# Patient Record
Sex: FEMALE | Race: WHITE | NOT HISPANIC OR LATINO | Employment: OTHER | URBAN - NONMETROPOLITAN AREA
[De-identification: names, ages, dates, MRNs, and addresses within clinical notes are randomized per-mention and may not be internally consistent; named-entity substitution may affect disease eponyms.]

---

## 2017-08-04 PROBLEM — M54.16 LUMBAR RADICULITIS: Status: ACTIVE | Noted: 2017-08-04

## 2017-09-01 PROBLEM — M47.816 FACET ARTHRITIS, DEGENERATIVE, LUMBAR SPINE: Status: ACTIVE | Noted: 2017-09-01

## 2019-12-20 PROBLEM — M47.896 OTHER SPONDYLOSIS, LUMBAR REGION: Status: ACTIVE | Noted: 2019-12-20

## 2020-08-12 PROBLEM — H25.11 AGE-RELATED NUCLEAR CATARACT, RIGHT EYE: Status: ACTIVE | Noted: 2020-08-12

## 2020-08-14 ENCOUNTER — HOSPITAL ENCOUNTER (OUTPATIENT)
Dept: PREADMISSION TESTING | Facility: HOSPITAL | Age: 65
Discharge: HOME | End: 2020-08-14
Attending: OPHTHALMOLOGY
Payer: COMMERCIAL

## 2020-08-14 VITALS — HEIGHT: 62 IN | BODY MASS INDEX: 21.71 KG/M2 | WEIGHT: 118 LBS

## 2020-08-14 DIAGNOSIS — Z03.818 ENCOUNTER FOR OBSERVATION FOR SUSPECTED EXPOSURE TO OTHER BIOLOGICAL AGENTS RULED OUT: ICD-10-CM

## 2020-08-14 NOTE — DISCHARGE INSTRUCTIONS
BEFORE THE PROCEDURE:    REPORT ANY CHANGE IN YOUR PHYSICAL CONDITION TO YOUR DOCTOR IMMEDIATELY.  SELF ISOLATE AND CHECK TEMPERATURE DAILY, IF TEMP OVER 100, CALL PHYSICIAN IMMEDIATELY.   NO SMOKING OR ALCOHOL FOR 72 HOURS BEFORE YOUR PROCEDURE.   DO NOT EAT OR DRINK ANYTHING AFTER MIDNIGHT THE NIGHT BEFORE YOUR PROCEDURE    THE MORNING OF YOUR PROCEDURE:    PLEASE ARRIVE AT THE FRONT LOBBY AT 6 a.m. AND REPORT TO FIRST FLOOR REGISTRATION.   YOU MAY GET A PHONE CALL THE DAY BEFORE YOUR PROCEDURE IF THE APPOINTED TIME CHANGES.  NO MAKE UP OR NAIL POLISH.   ALL MINORS MUST BE ACCOMPANIED BY AN ADULT AT ALL TIMES.     TAKE A SHOWER/BATH THE NIGHT BEFORE YOUR PROCEDURE.     DAY OF YOUR PROCEDURE:    TAKE BLOOD PRESSURE MEDICATIONS THE MORNING OF YOUR PROCEDURE, WITH SMALL SIPS WATER, AS DIRECTED BY YOUR PHYSICIAN.   DO NOT TAKE ANY DIABETIC MEDICATIONS UNLESS DIRECTED TO DO SO BY YOUR PHYSICIAN.   CONTACT LENSES AND DENTURES MUST BE REMOVED.  A RESPONSIBLE ADULT MUST ACCOMPANY YOU HOME UPON DISCHARGE.   ONLY 1 VISITORS ALLOWED PER ROOM.       COVID 19: RETURN TO FIRST FLOOR REGISTRATION ON Saturday AUGUST 15, 2020 @ 10:30 a.m.    BRING YOUR EYE DROPS DAY OF PROCEDURE

## 2020-08-15 ENCOUNTER — HOSPITAL ENCOUNTER (OUTPATIENT)
Dept: PREADMISSION TESTING | Facility: HOSPITAL | Age: 65
Discharge: HOME | End: 2020-08-15
Attending: OPHTHALMOLOGY
Payer: COMMERCIAL

## 2020-08-15 DIAGNOSIS — Z03.818 ENCOUNTER FOR OBSERVATION FOR SUSPECTED EXPOSURE TO OTHER BIOLOGICAL AGENTS RULED OUT: ICD-10-CM

## 2020-08-15 PROCEDURE — U0003 INFECTIOUS AGENT DETECTION BY NUCLEIC ACID (DNA OR RNA); SEVERE ACUTE RESPIRATORY SYNDROME CORONAVIRUS 2 (SARS-COV-2) (CORONAVIRUS DISEASE [COVID-19]), AMPLIFIED PROBE TECHNIQUE, MAKING USE OF HIGH THROUGHPUT TECHNOLOGIES AS DESCRIBED BY CMS-2020-01-R: HCPCS

## 2020-08-16 LAB — SARS-COV-2 RNA RESP QL NAA+PROBE: NOT DETECTED

## 2020-08-17 ENCOUNTER — ANESTHESIA EVENT (OUTPATIENT)
Dept: SURGERY | Facility: HOSPITAL | Age: 65
End: 2020-08-17
Payer: COMMERCIAL

## 2020-08-17 NOTE — ANESTHESIA PREPROCEDURE EVALUATION
08/17/2020  Tiffani Wheeler is a 64 y.o., female.    Anesthesia Evaluation    I have reviewed the Patient Summary Reports.   I have reviewed the NPO Status.   I have reviewed the Medications.     Review of Systems  Anesthesia Hx:  No problems with previous Anesthesia  Denies Family Hx of Anesthesia complications.  Personal Hx of Anesthesia complications  Difficult Intubation   Social:  Non-Smoker    Cardiovascular:   Hypertension    Pulmonary:   Asthma    Renal/:  Renal/ Normal     Hepatic/GI:   GERD    Musculoskeletal:   Arthritis   Lumbar Spine Disorders, Lumbar Disc Disease, Radiculopathy arthritis  Neurological:   Neuromuscular Disease, Headaches Raynauds   Endocrine:   Hypothyroidism        Physical Exam  General:  Well nourished    Airway/Jaw/Neck:  Airway Findings: Mouth Opening: Normal Tongue: Normal  General Airway Assessment: Adult  Mallampati: III  Improves to II with phonation.  TM Distance: Normal, at least 6 cm  Jaw/Neck Findings:  Neck ROM: Normal ROM      Dental:  Dental Findings: In tact   Chest/Lungs:  Chest/Lungs Findings: Clear to auscultation, Normal Respiratory Rate     Heart/Vascular:  Heart Findings: Rate: Normal  Rhythm: Regular Rhythm  Sounds: Normal        Mental Status:  Mental Status Findings:  Cooperative         Anesthesia Plan  Type of Anesthesia, risks & benefits discussed:  Anesthesia Type:  MAC  Patient's Preference:   Intra-op Monitoring Plan: standard ASA monitors  Intra-op Monitoring Plan Comments:   Post Op Pain Control Plan: multimodal analgesia  Post Op Pain Control Plan Comments:   Induction:    Beta Blocker:  Patient is not currently on a Beta-Blocker (No further documentation required).       Informed Consent: Patient understands risks and agrees with Anesthesia plan.  Questions answered. Anesthesia consent signed with patient.  ASA Score: 2     Day of  Surgery Review of History & Physical: I have interviewed and examined the patient. I have reviewed the patient's H&P dated:  There are no significant changes.  H&P update referred to the surgeon.         Ready For Surgery From Anesthesia Perspective.

## 2020-08-18 ENCOUNTER — ANESTHESIA (OUTPATIENT)
Dept: SURGERY | Facility: HOSPITAL | Age: 65
End: 2020-08-18
Payer: COMMERCIAL

## 2020-08-18 ENCOUNTER — HOSPITAL ENCOUNTER (OUTPATIENT)
Facility: HOSPITAL | Age: 65
Discharge: HOME | End: 2020-08-18
Attending: OPHTHALMOLOGY | Admitting: OPHTHALMOLOGY
Payer: COMMERCIAL

## 2020-08-18 VITALS
RESPIRATION RATE: 20 BRPM | TEMPERATURE: 98 F | HEART RATE: 79 BPM | DIASTOLIC BLOOD PRESSURE: 68 MMHG | OXYGEN SATURATION: 100 % | SYSTOLIC BLOOD PRESSURE: 147 MMHG

## 2020-08-18 DIAGNOSIS — H25.11 AGE-RELATED NUCLEAR CATARACT, RIGHT EYE: ICD-10-CM

## 2020-08-18 PROCEDURE — V2632 POST CHMBR INTRAOCULAR LENS: HCPCS | Performed by: OPHTHALMOLOGY

## 2020-08-18 PROCEDURE — 71000015 HC POSTOP RECOV 1ST HR: Performed by: OPHTHALMOLOGY

## 2020-08-18 PROCEDURE — 37000009 HC ANESTHESIA EA ADD 15 MINS: Performed by: OPHTHALMOLOGY

## 2020-08-18 PROCEDURE — 25000242 PHARM REV CODE 250 ALT 637 W/ HCPCS: Performed by: OPHTHALMOLOGY

## 2020-08-18 PROCEDURE — 25000003 PHARM REV CODE 250: Performed by: OPHTHALMOLOGY

## 2020-08-18 PROCEDURE — 63600175 PHARM REV CODE 636 W HCPCS: Performed by: OPHTHALMOLOGY

## 2020-08-18 PROCEDURE — 36000706: Performed by: OPHTHALMOLOGY

## 2020-08-18 PROCEDURE — 37000008 HC ANESTHESIA 1ST 15 MINUTES: Performed by: OPHTHALMOLOGY

## 2020-08-18 PROCEDURE — 36000707: Performed by: OPHTHALMOLOGY

## 2020-08-18 DEVICE — TECNIS 1-PC CLEAR MONO 6.0MM 17.5D
Type: IMPLANTABLE DEVICE | Site: EYE | Status: FUNCTIONAL
Brand: TECNIS IOL

## 2020-08-18 RX ORDER — LIDOCAINE HYDROCHLORIDE 10 MG/ML
0.5 INJECTION, SOLUTION EPIDURAL; INFILTRATION; INTRACAUDAL; PERINEURAL ONCE
Status: COMPLETED | OUTPATIENT
Start: 2020-08-18 | End: 2020-08-18

## 2020-08-18 RX ORDER — PHENYLEPHRINE HYDROCHLORIDE 25 MG/ML
1 SOLUTION/ DROPS OPHTHALMIC
Status: COMPLETED | OUTPATIENT
Start: 2020-08-18 | End: 2020-08-18

## 2020-08-18 RX ORDER — LIDOCAINE HYDROCHLORIDE 10 MG/ML
1 INJECTION, SOLUTION EPIDURAL; INFILTRATION; INTRACAUDAL; PERINEURAL ONCE
Status: ACTIVE | OUTPATIENT
Start: 2020-08-18

## 2020-08-18 RX ORDER — TETRACAINE HYDROCHLORIDE 5 MG/ML
SOLUTION OPHTHALMIC
Status: DISCONTINUED | OUTPATIENT
Start: 2020-08-18 | End: 2020-08-18 | Stop reason: HOSPADM

## 2020-08-18 RX ORDER — TETRACAINE HYDROCHLORIDE 5 MG/ML
1 SOLUTION OPHTHALMIC
Status: COMPLETED | OUTPATIENT
Start: 2020-08-18 | End: 2020-08-18

## 2020-08-18 RX ORDER — MIDAZOLAM HCL 2 MG/ML
4 SYRUP ORAL ONCE
Status: DISCONTINUED | OUTPATIENT
Start: 2020-08-18 | End: 2020-08-18

## 2020-08-18 RX ORDER — PREDNISOLONE ACETATE 10 MG/ML
SUSPENSION/ DROPS OPHTHALMIC
Status: DISCONTINUED | OUTPATIENT
Start: 2020-08-18 | End: 2020-08-18 | Stop reason: HOSPADM

## 2020-08-18 RX ORDER — MIDAZOLAM HCL 2 MG/ML
4 SYRUP ORAL ONCE
Status: COMPLETED | OUTPATIENT
Start: 2020-08-18 | End: 2020-08-18

## 2020-08-18 RX ORDER — MOXIFLOXACIN 5 MG/ML
1 SOLUTION/ DROPS OPHTHALMIC
Status: COMPLETED | OUTPATIENT
Start: 2020-08-18 | End: 2020-08-18

## 2020-08-18 RX ORDER — TROPICAMIDE 10 MG/ML
1 SOLUTION/ DROPS OPHTHALMIC
Status: COMPLETED | OUTPATIENT
Start: 2020-08-18 | End: 2020-08-18

## 2020-08-18 RX ORDER — MOXIFLOXACIN 5 MG/ML
SOLUTION/ DROPS OPHTHALMIC
Status: DISCONTINUED | OUTPATIENT
Start: 2020-08-18 | End: 2020-08-18 | Stop reason: HOSPADM

## 2020-08-18 RX ORDER — MIDAZOLAM HYDROCHLORIDE 1 MG/ML
INJECTION, SOLUTION INTRAMUSCULAR; INTRAVENOUS
Status: DISCONTINUED | OUTPATIENT
Start: 2020-08-18 | End: 2020-08-18

## 2020-08-18 RX ORDER — BROMFENAC SODIUM 0.81 MG/ML
SOLUTION/ DROPS OPHTHALMIC
Status: DISCONTINUED | OUTPATIENT
Start: 2020-08-18 | End: 2020-08-18 | Stop reason: HOSPADM

## 2020-08-18 RX ADMIN — TETRACAINE HYDROCHLORIDE 1 DROP: 5 SOLUTION OPHTHALMIC at 08:08

## 2020-08-18 RX ADMIN — MOXIFLOXACIN 1 DROP: 5 SOLUTION/ DROPS OPHTHALMIC at 08:08

## 2020-08-18 RX ADMIN — PHENYLEPHRINE AND KETOROLAC 1 EACH: 10.16; 2.88 INJECTION, SOLUTION, CONCENTRATE INTRAOCULAR at 09:08

## 2020-08-18 RX ADMIN — MOXIFLOXACIN 1 DROP: 5 SOLUTION/ DROPS OPHTHALMIC at 07:08

## 2020-08-18 RX ADMIN — TETRACAINE HYDROCHLORIDE 1 DROP: 5 SOLUTION OPHTHALMIC at 07:08

## 2020-08-18 RX ADMIN — PHENYLEPHRINE HYDROCHLORIDE 1 DROP: 25 SOLUTION/ DROPS OPHTHALMIC at 07:08

## 2020-08-18 RX ADMIN — TROPICAMIDE 1 DROP: 10 SOLUTION/ DROPS OPHTHALMIC at 07:08

## 2020-08-18 RX ADMIN — MIDAZOLAM HYDROCHLORIDE 2 MG: 1 INJECTION, SOLUTION INTRAMUSCULAR; INTRAVENOUS at 09:08

## 2020-08-18 RX ADMIN — LIDOCAINE HYDROCHLORIDE 5 MG: 10 INJECTION, SOLUTION EPIDURAL; INFILTRATION; INTRACAUDAL at 09:08

## 2020-08-18 RX ADMIN — TROPICAMIDE 1 DROP: 10 SOLUTION/ DROPS OPHTHALMIC at 08:08

## 2020-08-18 RX ADMIN — PHENYLEPHRINE HYDROCHLORIDE 1 DROP: 25 SOLUTION/ DROPS OPHTHALMIC at 08:08

## 2020-08-18 RX ADMIN — MIDAZOLAM HYDROCHLORIDE 4 MG: 2 SYRUP ORAL at 08:08

## 2020-08-18 NOTE — INTERVAL H&P NOTE
The patient has been examined and the H&P has been reviewed:    I concur with the findings and no changes have occurred since H&P was written.    Surgery risks, benefits and alternative options discussed and understood by patient/family.          Active Hospital Problems    Diagnosis  POA    *Age-related nuclear cataract, right eye [H25.11]  Yes      Resolved Hospital Problems   No resolved problems to display.

## 2020-08-18 NOTE — ADDENDUM NOTE
Addendum  created 08/18/20 1005 by Clay Patel MD    Attestation recorded in Intraprocedure, Intraprocedure Attestations filed

## 2020-08-18 NOTE — DISCHARGE SUMMARY
[facility]  Brief Discharge Note    Patient Name:  Tiffani Wheeler   MRN:  [id number]    :  1955   Admission Date:  2020    Discharge Date:  2020   Attending Physician: Bill Mosqueda MD     Procedure:  FEMTOSECOND LASER AND PHACOEMULSIFICATION, CATARACT (Right)    OUTCOME: Patient tolerated procedure well without complication and is now ready for discharge.    DISPOSITION: Home or Self Care    FINAL DIAGNOSIS:  Age-related nuclear cataract, right eye    FOLLOWUP: 1 day in clinic    DISCHARGE INSTRUCTIONS:  Wear eye shield until your schedule appointment with doctor.                                                       Only remove eye shield to apply eye drops.                                                       Follow the post-op eye instructions given from the clinic.

## 2020-08-18 NOTE — TRANSFER OF CARE
Anesthesia Transfer of Care Note    Patient: Tiffani Wheeler    Procedure(s) Performed: Procedure(s) (LRB):  PHACOEMULSIFICATION, CATARACT w/iol W/FEMTO LASER (Right)    Patient location: OPS    Anesthesia Type: general    Transport from OR: Transported from OR on room air with adequate spontaneous ventilation    Post pain: adequate analgesia    Post assessment: no apparent anesthetic complications    Post vital signs: stable    Level of consciousness: awake    Nausea/Vomiting: no nausea/vomiting    Complications: none    Transfer of care protocol was followed      Last vitals:   Visit Vitals  BP (!) 147/68 (Patient Position: Lying)   Pulse 79   Temp 36.7 °C (98.1 °F) (Oral)   Resp 20   SpO2 100%   Breastfeeding No

## 2020-08-18 NOTE — OP NOTE
[facility]  Operative Note    Date:  2020     Patient:  Tiffani Wheeler   MRN:  [id number]   :  1955    Surgeon:  Bill Mosqueda MD    PREOPERATIVE DIAGNOSIS:  Visually significant age-related nuclear cataract right eye.    POSTOPERATIVE DIAGNOSIS:  Visually significant age-related nuclear cataract right eye.    PROCEDURE:  Femtosecond LASER and phacoemulsification of cataract with posterior chamber intraocular lens implant, right eye.    ANESTHESIA:  Topical with MAC.      COMPLICATIONS:  None.    ESTIMATED BLOOD LOSS:  Minimal.    PROCEDURE IN DETAIL:  The patient presented to our clinic complaining of increasing difficulties with activities of daily living due to a visually significant cataract in the right eye.  After risks, benefits, and alternatives were explained to the patient, the patient agreed to proceed with the above procedure.  The patient was brought to a LASER suite where Femtosecond LASER was performed on the capsule and cataract without complication.  The patient was brought to the operating room and received topical anesthetic to the right eye and was then prepped and draped in the usual sterile fashion.  The right eye was first entered at 11:00 o'clock paracentesis site followed by intracameral lidocaine, and Viscoat.  The primary surgical site was then created at 8:30 o'clock followed by continuous capsulotomy, hydrodissection, and phacoemulsification of the cataract.  Residual cortical material was removed using the automated irrigation-aspiration technique.  Provisc was injected into the posterior chamber and an DOYLE model ZCB00 17.5 diopter lens was placed in the bag without difficulty.  Residual Provisc was removed using the automated irrigation-aspiration technique.  The eye was re-pressurized using BSS solution, and both the paracentesis and primary surgical site were demonstrated to be watertight at the end of the case with Weck-Mariah manipulation.  Vigamox, Pred  Forte, and Prolensa were placed in the eye followed by placement of a Barnett shield.  The patient tolerated the procedure well and was taken to the recovery room in good and stable condition.  The patient was instructed to refrain from any heavy lifting, bending, stooping, or straining activities and to follow-up in the morning for routine post-operative care with Dr. Bill Mosqueda.

## 2020-08-18 NOTE — DISCHARGE INSTRUCTIONS
-RELAX AT HOME FOR THE REST OF THE DAY. YOU MAY EAT ANYTHING YOU LIKE.   -PLAN TO SEE DR CALDERA TOMORROW AT THE OFFICE. BRING ALL EYE DROPS WITH YOU TO THIS APPOINTMENT.    -PUT EYE DROPS IN THE AFFECTED EYE AS PER DR CALDERA'S EYE DROP SCHEDULED. -USE IN ANY ORDER, WAIT 5 MINUTES BETWEEN DROPS.  -REMOVE EYE SHIELD WHILE PUTTING EYE DROPS IN.    -DO NOT RUB YOUR EYE!!  -DO NOT EXERT YOURSELF - NO HEAVY LIFTING, RUNNING OR SWIMMING FOR 1 WEEK.  -YOU MAY SHOWER, BUT DON'T ALLOW WATER INTO YOUR EYE FOR 1 WEEK.  -WEAR PROTECTIVE SUNGLASSES DURING THE DAY AND AN EYE SHIELD AT NIGHT FOR 1 WEEK.    -IF YOU HAVE PAIN, REDNESS OR DECREASED VISION, CALL DR CALDERA'S OFFICE [phone] OR IF AFTER HOURS CALL [phone].

## 2020-08-18 NOTE — ANESTHESIA POSTPROCEDURE EVALUATION
Anesthesia Post Evaluation    Patient: Tiffani Wheeler    Procedure(s) Performed: Procedure(s) (LRB):  PHACOEMULSIFICATION, CATARACT w/iol W/FEMTO LASER (Right)    Final Anesthesia Type: MAC    Patient location during evaluation: PACU  Patient participation: Yes- Able to Participate  Level of consciousness: awake  Post-procedure vital signs: reviewed and stable  Pain management: adequate  Airway patency: patent    PONV status at discharge: No PONV  Anesthetic complications: no      Cardiovascular status: blood pressure returned to baseline  Respiratory status: spontaneous ventilation  Hydration status: euvolemic  Follow-up not needed.          Vitals Value Taken Time   /68 08/18/20 0734   Temp 36.7 °C (98.1 °F) 08/18/20 0734   Pulse 79 08/18/20 0734   Resp 20 08/18/20 0734   SpO2 100 % 08/18/20 0734         No case tracking events are documented in the log.      Pain/Lux Score: No data recorded

## 2020-09-26 ENCOUNTER — HOSPITAL ENCOUNTER (OUTPATIENT)
Dept: PREADMISSION TESTING | Facility: HOSPITAL | Age: 65
Discharge: HOME | End: 2020-09-26
Attending: OPHTHALMOLOGY
Payer: COMMERCIAL

## 2020-09-26 DIAGNOSIS — Z03.818 ENCOUNTER FOR OBSERVATION FOR SUSPECTED EXPOSURE TO OTHER BIOLOGICAL AGENTS RULED OUT: ICD-10-CM

## 2020-09-26 LAB — SARS-COV-2 RNA RESP QL NAA+PROBE: NOT DETECTED

## 2020-09-26 PROCEDURE — U0002 COVID-19 LAB TEST NON-CDC: HCPCS

## 2020-09-27 PROBLEM — H25.12 AGE-RELATED NUCLEAR CATARACT, LEFT EYE: Status: ACTIVE | Noted: 2020-09-27

## 2020-09-27 RX ORDER — LIDOCAINE HYDROCHLORIDE 10 MG/ML
0.5 INJECTION, SOLUTION EPIDURAL; INFILTRATION; INTRACAUDAL; PERINEURAL ONCE
Status: CANCELLED | OUTPATIENT
Start: 2020-09-27 | End: 2020-09-27

## 2020-09-28 ENCOUNTER — ANESTHESIA EVENT (OUTPATIENT)
Dept: SURGERY | Facility: HOSPITAL | Age: 65
End: 2020-09-28
Payer: COMMERCIAL

## 2020-09-28 NOTE — ANESTHESIA PREPROCEDURE EVALUATION
09/28/2020  Tiffani Wheeler is a 64 y.o., female.    Anesthesia Evaluation    I have reviewed the Patient Summary Reports.   I have reviewed the NPO Status.   I have reviewed the Medications.     Review of Systems  Anesthesia Hx:  No problems with previous Anesthesia  Denies Family Hx of Anesthesia complications.  Personal Hx of Anesthesia complications  Difficult Intubation   Social:  Non-Smoker    Cardiovascular:   Hypertension    Pulmonary:   Asthma    Renal/:  Renal/ Normal     Hepatic/GI:   GERD    Musculoskeletal:   Arthritis     Neurological:   Neuromuscular Disease, Headaches HX MIGRANES RAYNAUDS   Endocrine:   Hypothyroidism        Physical Exam  General:  Well nourished    Airway/Jaw/Neck:  Airway Findings: Mouth Opening: Normal Tongue: Normal  General Airway Assessment: Adult  Mallampati: IV  Improves to IV with phonation.  TM Distance: Normal, at least 6 cm  Jaw/Neck Findings:  Neck ROM: Normal ROM      Dental:  Dental Findings: In tact   Chest/Lungs:  Chest/Lungs Findings: Clear to auscultation, Normal Respiratory Rate     Heart/Vascular:  Heart Findings: Rate: Normal  Rhythm: Regular Rhythm  Sounds: Normal        Mental Status:  Mental Status Findings:  Cooperative         Anesthesia Plan  Type of Anesthesia, risks & benefits discussed:  Anesthesia Type:  MAC  Patient's Preference:   Intra-op Monitoring Plan: standard ASA monitors  Intra-op Monitoring Plan Comments:   Post Op Pain Control Plan: multimodal analgesia  Post Op Pain Control Plan Comments:   Induction:    Beta Blocker:         Informed Consent: Patient understands risks and agrees with Anesthesia plan.  Questions answered. Anesthesia consent signed with patient.  ASA Score: 3     Day of Surgery Review of History & Physical: I have interviewed and examined the patient. I have reviewed the patient's H&P dated:  There are no  significant changes.  H&P update referred to the surgeon.         Ready For Surgery From Anesthesia Perspective.

## 2020-09-29 ENCOUNTER — ANESTHESIA (OUTPATIENT)
Dept: SURGERY | Facility: HOSPITAL | Age: 65
End: 2020-09-29
Payer: COMMERCIAL

## 2020-09-29 ENCOUNTER — HOSPITAL ENCOUNTER (OUTPATIENT)
Facility: HOSPITAL | Age: 65
Discharge: HOME | End: 2020-09-29
Attending: OPHTHALMOLOGY | Admitting: OPHTHALMOLOGY
Payer: COMMERCIAL

## 2020-09-29 VITALS
DIASTOLIC BLOOD PRESSURE: 66 MMHG | SYSTOLIC BLOOD PRESSURE: 127 MMHG | TEMPERATURE: 98 F | RESPIRATION RATE: 18 BRPM | HEART RATE: 63 BPM | OXYGEN SATURATION: 98 %

## 2020-09-29 DIAGNOSIS — H25.12 AGE-RELATED NUCLEAR CATARACT, LEFT EYE: ICD-10-CM

## 2020-09-29 PROCEDURE — 37000009 HC ANESTHESIA EA ADD 15 MINS: Performed by: OPHTHALMOLOGY

## 2020-09-29 PROCEDURE — 25000242 PHARM REV CODE 250 ALT 637 W/ HCPCS: Performed by: ANESTHESIOLOGY

## 2020-09-29 PROCEDURE — 25000003 PHARM REV CODE 250: Performed by: OPHTHALMOLOGY

## 2020-09-29 PROCEDURE — 71000015 HC POSTOP RECOV 1ST HR: Performed by: OPHTHALMOLOGY

## 2020-09-29 PROCEDURE — 36000706: Performed by: OPHTHALMOLOGY

## 2020-09-29 PROCEDURE — 37000008 HC ANESTHESIA 1ST 15 MINUTES: Performed by: OPHTHALMOLOGY

## 2020-09-29 PROCEDURE — 36000707: Performed by: OPHTHALMOLOGY

## 2020-09-29 PROCEDURE — 63600175 PHARM REV CODE 636 W HCPCS: Performed by: OPHTHALMOLOGY

## 2020-09-29 PROCEDURE — V2632 POST CHMBR INTRAOCULAR LENS: HCPCS | Performed by: OPHTHALMOLOGY

## 2020-09-29 DEVICE — TECNIS 1-PC CLEAR MONO 6.0MM 19.0D
Type: IMPLANTABLE DEVICE | Site: EYE | Status: FUNCTIONAL
Brand: TECNIS IOL

## 2020-09-29 RX ORDER — PREDNISOLONE ACETATE 10 MG/ML
SUSPENSION/ DROPS OPHTHALMIC
Status: DISCONTINUED | OUTPATIENT
Start: 2020-09-29 | End: 2020-09-29 | Stop reason: HOSPADM

## 2020-09-29 RX ORDER — LIDOCAINE HYDROCHLORIDE 10 MG/ML
1 INJECTION, SOLUTION EPIDURAL; INFILTRATION; INTRACAUDAL; PERINEURAL ONCE
Status: ACTIVE | OUTPATIENT
Start: 2020-09-29

## 2020-09-29 RX ORDER — TROPICAMIDE 10 MG/ML
1 SOLUTION/ DROPS OPHTHALMIC
Status: DISPENSED | OUTPATIENT
Start: 2020-09-29 | End: 2020-09-29

## 2020-09-29 RX ORDER — MIDAZOLAM HCL 2 MG/ML
4 SYRUP ORAL ONCE
Status: COMPLETED | OUTPATIENT
Start: 2020-09-29 | End: 2020-09-29

## 2020-09-29 RX ORDER — PHENYLEPHRINE HYDROCHLORIDE 25 MG/ML
1 SOLUTION/ DROPS OPHTHALMIC
Status: DISPENSED | OUTPATIENT
Start: 2020-09-29 | End: 2020-09-29

## 2020-09-29 RX ORDER — LIDOCAINE HYDROCHLORIDE 10 MG/ML
INJECTION, SOLUTION EPIDURAL; INFILTRATION; INTRACAUDAL; PERINEURAL
Status: DISCONTINUED | OUTPATIENT
Start: 2020-09-29 | End: 2020-09-29 | Stop reason: HOSPADM

## 2020-09-29 RX ORDER — TETRACAINE HYDROCHLORIDE 5 MG/ML
SOLUTION OPHTHALMIC
Status: DISCONTINUED | OUTPATIENT
Start: 2020-09-29 | End: 2020-09-29 | Stop reason: HOSPADM

## 2020-09-29 RX ORDER — BROMFENAC SODIUM 0.81 MG/ML
SOLUTION/ DROPS OPHTHALMIC
Status: DISCONTINUED | OUTPATIENT
Start: 2020-09-29 | End: 2020-09-29 | Stop reason: HOSPADM

## 2020-09-29 RX ORDER — MIDAZOLAM HYDROCHLORIDE 1 MG/ML
INJECTION, SOLUTION INTRAMUSCULAR; INTRAVENOUS
Status: DISCONTINUED | OUTPATIENT
Start: 2020-09-29 | End: 2020-09-29

## 2020-09-29 RX ORDER — TETRACAINE HYDROCHLORIDE 5 MG/ML
1 SOLUTION OPHTHALMIC
Status: DISPENSED | OUTPATIENT
Start: 2020-09-29 | End: 2020-09-29

## 2020-09-29 RX ORDER — MOXIFLOXACIN 5 MG/ML
1 SOLUTION/ DROPS OPHTHALMIC
Status: DISPENSED | OUTPATIENT
Start: 2020-09-29 | End: 2020-09-29

## 2020-09-29 RX ORDER — MOXIFLOXACIN 5 MG/ML
SOLUTION/ DROPS OPHTHALMIC
Status: DISCONTINUED | OUTPATIENT
Start: 2020-09-29 | End: 2020-09-29 | Stop reason: HOSPADM

## 2020-09-29 RX ADMIN — MIDAZOLAM HYDROCHLORIDE 4 MG: 2 SYRUP ORAL at 07:09

## 2020-09-29 RX ADMIN — MIDAZOLAM HYDROCHLORIDE 2 MG: 1 INJECTION, SOLUTION INTRAMUSCULAR; INTRAVENOUS at 07:09

## 2020-09-29 RX ADMIN — TROPICAMIDE 1 DROP: 10 SOLUTION/ DROPS OPHTHALMIC at 06:09

## 2020-09-29 RX ADMIN — TETRACAINE HYDROCHLORIDE 1 DROP: 5 SOLUTION OPHTHALMIC at 06:09

## 2020-09-29 NOTE — INTERVAL H&P NOTE
The patient has been examined and the H&P has been reviewed:    I concur with the findings and no changes have occurred since H&P was written.    Surgery risks, benefits and alternative options discussed and understood by patient/family.          Active Hospital Problems    Diagnosis  POA    *Age-related nuclear cataract, left eye [H25.12]  Yes      Resolved Hospital Problems   No resolved problems to display.

## 2020-09-29 NOTE — DISCHARGE SUMMARY
[facility]  Brief Discharge Note    Patient Name:  Tiffani Wheeler   MRN:  [id number]    :  1955   Admission Date:  2020   Discharge Date:  2020   Attending Physician: Bill Mosqueda MD     Procedure:  FEMTOSECOND LASER AND PHACOEMULSIFICATION, CATARACT (Left)    OUTCOME: Patient tolerated procedure well without complication and is now ready for discharge.    DISPOSITION: Home or Self Care    FINAL DIAGNOSIS:  Age-related nuclear cataract, left eye    FOLLOWUP: 1 day in clinic    DISCHARGE INSTRUCTIONS:  Wear eye shield until your schedule appointment with doctor.                                                       Only remove eye shield to apply eye drops.                                                       Follow the post-op eye instructions given from the clinic.

## 2020-09-29 NOTE — TRANSFER OF CARE
Anesthesia Transfer of Care Note    Patient: Tiffani Wheeler    Procedure(s) Performed: Procedure(s) (LRB):  PHACOEMULSIFICATION, CATARACT, WITH IOL INSERTION w/femto (Left)    Patient location: OPS    Anesthesia Type: MAC    Transport from OR: Transported from OR on room air with adequate spontaneous ventilation    Post pain: adequate analgesia    Post assessment: no apparent anesthetic complications    Post vital signs: stable    Level of consciousness: awake    Nausea/Vomiting: no nausea/vomiting    Complications: none    Transfer of care protocol was followed      Last vitals:   Visit Vitals  BP (!) 108/57 (Patient Position: Sitting)   Pulse 69   Temp 36.1 °C (96.9 °F) (Oral)   Resp 20   SpO2 100%   Breastfeeding No

## 2020-09-29 NOTE — OP NOTE
[facility]  Operative Note    Date:  2020     Patient:  Tiffani Wheeler   MRN:  [id number]   :  1955    Surgeon:  Bill Mosqueda MD    PREOPERATIVE DIAGNOSIS:  Visually significant age-related nuclear cataract, left eye.    POSTOPERATIVE DIAGNOSIS:  Visually significant age-related nuclear cataract, left eye.    PROCEDURE:  Femtosecond LASER and phacoemulsification of cataract with posterior chamber intraocular lens implant, left eye.    ANESTHESIA:  Topical with MAC.      COMPLICATIONS:  None.    ESTIMATED BLOOD LOSS:  Minimal.    PROCEDURE IN DETAIL:  The patient presented to our clinic complaining of increasing difficulties with activities of daily living due to a visually significant cataract in the left eye.  After risks, benefits, and alternatives were explained to the patient, the patient agreed to proceed with the above procedure.  The patient was brought to a LASER suite where Femtosecond LASER was performed on the capsule and cataract without complication.  The patient was brought to the operating room and received topical anesthetic to the left eye and was then prepped and draped in the usual sterile fashion.  The left eye was first entered at 5:00 o'clock paracentesis site followed by intracameral lidocaine, and Viscoat.  The primary surgical site was then created at 2:30 o'clock followed by continuous capsulotomy, hydrodissection, and phacoemulsification of the cataract.  Residual cortical material was removed using the automated irrigation-aspiration technique.  Provisc was injected into the posterior chamber and an DOYLE model ZCB00 19.0 diopter lens was placed in the bag without difficulty.  Residual Provisc was removed using the automated irrigation-aspiration technique.  The eye was re-pressurized using BSS solution, and both the paracentesis and primary surgical site were demonstrated to be watertight at the end of the case with Weck-Mariah manipulation.  Vigamox, Pred Forte,  and Prolensa were placed in the eye followed by placement of a Barnett shield.  The patient tolerated the procedure well and was taken to the recovery room in good and stable condition.  The patient was instructed to refrain from any heavy lifting, bending, stooping, or straining activities and to follow-up in the morning for routine post-operative care with Dr. Bill Mosqueda.

## 2020-09-29 NOTE — ANESTHESIA POSTPROCEDURE EVALUATION
Anesthesia Post Evaluation    Patient: Tiffani Wheeler    Procedure(s) Performed: Procedure(s) (LRB):  PHACOEMULSIFICATION, CATARACT, WITH IOL INSERTION w/femto (Left)    Final Anesthesia Type: MAC    Patient location during evaluation: OPS  Patient participation: Yes- Able to Participate  Level of consciousness: awake  Post-procedure vital signs: reviewed and stable  Pain management: adequate  Airway patency: patent    PONV status at discharge: No PONV  Anesthetic complications: no      Cardiovascular status: blood pressure returned to baseline  Respiratory status: spontaneous ventilation  Hydration status: euvolemic  Follow-up not needed.          Vitals Value Taken Time   /57 09/29/20 0620   Temp 36.1 °C (96.9 °F) 09/29/20 0620   Pulse 69 09/29/20 0620   Resp 20 09/29/20 0620   SpO2 100 % 09/29/20 0620         No case tracking events are documented in the log.      Pain/Lux Score: No data recorded

## 2020-09-29 NOTE — DISCHARGE INSTRUCTIONS
-RELAX AT HOME FOR THE REST OF THE DAY. YOU MAY EAT ANYTHING YOU LIKE.   -PLAN TO SEE DR CALDERA TOMORROW AT THE OFFICE. BRING ALL EYE DROPS WITH YOU TO THIS APPOINTMENT.    -PUT EYE DROPS IN THE AFFECTED EYE AS PER DR CALDERA'S EYE DROP SCHEDULED. -USE IN ANY ORDER, WAIT 5 MINUTES BETWEEN DROPS.  -REMOVE EYE SHIELD WHILE PUTTING EYE DROPS IN.    -DO NOT RUB YOUR EYE!!  -DO NOT EXERT YOURSELF - NO HEAVY LIFTING, RUNNING OR SWIMMING FOR 1 WEEK.  -YOU MAY SHOWER, BUT DON'T ALLOW WATER INTO YOUR EYE FOR 1 WEEK.  -WEAR PROTECTIVE SUNGLASSES DURING THE DAY AND AN EYE SHIELD AT NIGHT FOR 1 WEEK.    -IF YOU HAVE PAIN, REDNESS OR DECREASED VISION, CALL DR CALDERA'S OFFICE [phone] OR IF AFTER HOURS CALL [phone].

## 2021-01-11 DIAGNOSIS — M85.80 OSTEOPENIA, SENILE: Primary | ICD-10-CM

## 2021-01-20 ENCOUNTER — HOSPITAL ENCOUNTER (OUTPATIENT)
Dept: RADIOLOGY | Facility: HOSPITAL | Age: 66
Discharge: HOME | End: 2021-01-20
Attending: SPECIALIST
Payer: MEDICARE

## 2021-01-20 DIAGNOSIS — M85.80 OSTEOPENIA, SENILE: ICD-10-CM

## 2021-01-20 PROCEDURE — 77080 DXA BONE DENSITY AXIAL: CPT | Mod: TC

## 2021-02-04 ENCOUNTER — PATIENT MESSAGE (OUTPATIENT)
Dept: ADMINISTRATIVE | Facility: CLINIC | Age: 66
End: 2021-02-04

## 2021-05-04 ENCOUNTER — PATIENT MESSAGE (OUTPATIENT)
Dept: RESEARCH | Facility: HOSPITAL | Age: 66
End: 2021-05-04

## 2021-05-13 ENCOUNTER — LAB VISIT (OUTPATIENT)
Dept: LAB | Facility: HOSPITAL | Age: 66
End: 2021-05-13
Attending: SPECIALIST
Payer: MEDICARE

## 2021-05-13 DIAGNOSIS — Z79.899 ENCOUNTER FOR LONG-TERM (CURRENT) USE OF OTHER MEDICATIONS: Primary | ICD-10-CM

## 2021-05-13 LAB
ALBUMIN SERPL BCP-MCNC: 4.2 G/DL (ref 3.5–5.2)
ALP SERPL-CCNC: 38 U/L (ref 55–135)
ALT SERPL W/O P-5'-P-CCNC: 33 U/L (ref 10–44)
ANION GAP SERPL CALC-SCNC: 2 MMOL/L (ref 8–16)
AST SERPL-CCNC: 18 U/L (ref 10–40)
BASOPHILS # BLD AUTO: 0.03 K/UL (ref 0–0.2)
BASOPHILS NFR BLD: 0.3 % (ref 0–1.9)
BILIRUB SERPL-MCNC: 0.3 MG/DL (ref 0.1–1)
BUN SERPL-MCNC: 14 MG/DL (ref 8–23)
CALCIUM SERPL-MCNC: 9.5 MG/DL (ref 8.7–10.5)
CHLORIDE SERPL-SCNC: 107 MMOL/L (ref 95–110)
CO2 SERPL-SCNC: 30 MMOL/L (ref 23–29)
CREAT SERPL-MCNC: 0.9 MG/DL (ref 0.5–1.4)
CRP SERPL-MCNC: 0.3 MG/DL (ref 0–0.75)
DIFFERENTIAL METHOD: ABNORMAL
EOSINOPHIL # BLD AUTO: 0.1 K/UL (ref 0–0.5)
EOSINOPHIL NFR BLD: 0.9 % (ref 0–8)
ERYTHROCYTE [DISTWIDTH] IN BLOOD BY AUTOMATED COUNT: 13.5 % (ref 11.5–14.5)
ERYTHROCYTE [SEDIMENTATION RATE] IN BLOOD: 4 MM/HR (ref 0–20)
EST. GFR  (AFRICAN AMERICAN): >60 ML/MIN/1.73 M^2
EST. GFR  (NON AFRICAN AMERICAN): >60 ML/MIN/1.73 M^2
GLUCOSE SERPL-MCNC: 115 MG/DL (ref 70–110)
HCT VFR BLD AUTO: 40.4 % (ref 37–48.5)
HGB BLD-MCNC: 13.8 G/DL (ref 12–16)
IMM GRANULOCYTES # BLD AUTO: 0.03 K/UL (ref 0–0.04)
IMM GRANULOCYTES NFR BLD AUTO: 0.3 % (ref 0–0.5)
LYMPHOCYTES # BLD AUTO: 3.7 K/UL (ref 1–4.8)
LYMPHOCYTES NFR BLD: 41.3 % (ref 18–48)
MCH RBC QN AUTO: 34.8 PG (ref 27–31)
MCHC RBC AUTO-ENTMCNC: 34.2 G/DL (ref 32–36)
MCV RBC AUTO: 102 FL (ref 82–98)
MONOCYTES # BLD AUTO: 0.9 K/UL (ref 0.3–1)
MONOCYTES NFR BLD: 9.6 % (ref 4–15)
NEUTROPHILS # BLD AUTO: 4.3 K/UL (ref 1.8–7.7)
NEUTROPHILS NFR BLD: 47.6 % (ref 38–73)
NRBC BLD-RTO: 0 /100 WBC
PLATELET # BLD AUTO: 264 K/UL (ref 150–450)
PMV BLD AUTO: 9.4 FL (ref 9.2–12.9)
POTASSIUM SERPL-SCNC: 3.7 MMOL/L (ref 3.5–5.1)
PROT SERPL-MCNC: 7.8 G/DL (ref 6–8.4)
RBC # BLD AUTO: 3.97 M/UL (ref 4–5.4)
SODIUM SERPL-SCNC: 139 MMOL/L (ref 136–145)
WBC # BLD AUTO: 8.95 K/UL (ref 3.9–12.7)

## 2021-05-13 PROCEDURE — 86140 C-REACTIVE PROTEIN: CPT | Performed by: SPECIALIST

## 2021-05-13 PROCEDURE — 36415 COLL VENOUS BLD VENIPUNCTURE: CPT | Performed by: SPECIALIST

## 2021-05-13 PROCEDURE — 85025 COMPLETE CBC W/AUTO DIFF WBC: CPT | Performed by: SPECIALIST

## 2021-05-13 PROCEDURE — 85651 RBC SED RATE NONAUTOMATED: CPT | Performed by: SPECIALIST

## 2021-05-13 PROCEDURE — 80053 COMPREHEN METABOLIC PANEL: CPT | Performed by: SPECIALIST

## 2021-07-13 ENCOUNTER — LAB VISIT (OUTPATIENT)
Dept: LAB | Facility: HOSPITAL | Age: 66
End: 2021-07-13
Attending: SPECIALIST
Payer: MEDICARE

## 2021-07-13 DIAGNOSIS — M05.79 SEROPOSITIVE RHEUMATOID ARTHRITIS OF MULTIPLE SITES: Primary | ICD-10-CM

## 2021-07-13 DIAGNOSIS — M25.571 RIGHT ANKLE PAIN: ICD-10-CM

## 2021-07-13 LAB
ALBUMIN SERPL BCP-MCNC: 3.9 G/DL (ref 3.5–5.2)
ALP SERPL-CCNC: 52 U/L (ref 55–135)
ALT SERPL W/O P-5'-P-CCNC: 28 U/L (ref 10–44)
ANION GAP SERPL CALC-SCNC: 6 MMOL/L (ref 8–16)
AST SERPL-CCNC: 15 U/L (ref 10–40)
BASOPHILS # BLD AUTO: 0.05 K/UL (ref 0–0.2)
BASOPHILS NFR BLD: 0.3 % (ref 0–1.9)
BILIRUB SERPL-MCNC: 0.3 MG/DL (ref 0.1–1)
BUN SERPL-MCNC: 24 MG/DL (ref 8–23)
CALCIUM SERPL-MCNC: 9.3 MG/DL (ref 8.7–10.5)
CHLORIDE SERPL-SCNC: 107 MMOL/L (ref 95–110)
CO2 SERPL-SCNC: 26 MMOL/L (ref 23–29)
CREAT SERPL-MCNC: 1 MG/DL (ref 0.5–1.4)
CRP SERPL-MCNC: 2.45 MG/DL (ref 0–0.75)
DIFFERENTIAL METHOD: ABNORMAL
EOSINOPHIL # BLD AUTO: 0.2 K/UL (ref 0–0.5)
EOSINOPHIL NFR BLD: 1.1 % (ref 0–8)
ERYTHROCYTE [DISTWIDTH] IN BLOOD BY AUTOMATED COUNT: 13.2 % (ref 11.5–14.5)
ERYTHROCYTE [SEDIMENTATION RATE] IN BLOOD: 35 MM/HR (ref 0–20)
EST. GFR  (AFRICAN AMERICAN): >60 ML/MIN/1.73 M^2
EST. GFR  (NON AFRICAN AMERICAN): 59.3 ML/MIN/1.73 M^2
GLUCOSE SERPL-MCNC: 105 MG/DL (ref 70–110)
HCT VFR BLD AUTO: 40.7 % (ref 37–48.5)
HGB BLD-MCNC: 14 G/DL (ref 12–16)
IMM GRANULOCYTES # BLD AUTO: 0.13 K/UL (ref 0–0.04)
IMM GRANULOCYTES NFR BLD AUTO: 0.9 % (ref 0–0.5)
LYMPHOCYTES # BLD AUTO: 4.5 K/UL (ref 1–4.8)
LYMPHOCYTES NFR BLD: 29.4 % (ref 18–48)
MCH RBC QN AUTO: 34.8 PG (ref 27–31)
MCHC RBC AUTO-ENTMCNC: 34.4 G/DL (ref 32–36)
MCV RBC AUTO: 101 FL (ref 82–98)
MONOCYTES # BLD AUTO: 1.7 K/UL (ref 0.3–1)
MONOCYTES NFR BLD: 10.9 % (ref 4–15)
NEUTROPHILS # BLD AUTO: 8.7 K/UL (ref 1.8–7.7)
NEUTROPHILS NFR BLD: 57.4 % (ref 38–73)
NRBC BLD-RTO: 0 /100 WBC
PLATELET # BLD AUTO: 282 K/UL (ref 150–450)
PMV BLD AUTO: 9.3 FL (ref 9.2–12.9)
POTASSIUM SERPL-SCNC: 3.4 MMOL/L (ref 3.5–5.1)
PROT SERPL-MCNC: 8 G/DL (ref 6–8.4)
RBC # BLD AUTO: 4.02 M/UL (ref 4–5.4)
SODIUM SERPL-SCNC: 139 MMOL/L (ref 136–145)
URATE SERPL-MCNC: 6 MG/DL (ref 2.4–5.7)
WBC # BLD AUTO: 15.21 K/UL (ref 3.9–12.7)

## 2021-07-13 PROCEDURE — 85651 RBC SED RATE NONAUTOMATED: CPT | Performed by: SPECIALIST

## 2021-07-13 PROCEDURE — 80053 COMPREHEN METABOLIC PANEL: CPT | Performed by: SPECIALIST

## 2021-07-13 PROCEDURE — 36415 COLL VENOUS BLD VENIPUNCTURE: CPT | Performed by: SPECIALIST

## 2021-07-13 PROCEDURE — 86140 C-REACTIVE PROTEIN: CPT | Performed by: SPECIALIST

## 2021-07-13 PROCEDURE — 84550 ASSAY OF BLOOD/URIC ACID: CPT | Performed by: SPECIALIST

## 2021-07-13 PROCEDURE — 85025 COMPLETE CBC W/AUTO DIFF WBC: CPT | Performed by: SPECIALIST

## 2021-07-22 ENCOUNTER — LAB VISIT (OUTPATIENT)
Dept: LAB | Facility: HOSPITAL | Age: 66
End: 2021-07-22
Attending: INTERNAL MEDICINE
Payer: MEDICARE

## 2021-07-22 DIAGNOSIS — M86.60 CHRONIC OSTEOMYELITIS WITH OR WITHOUT PERIOSTITIS: Primary | ICD-10-CM

## 2021-07-22 LAB — VANCOMYCIN TROUGH SERPL-MCNC: 26 UG/ML (ref 10–22)

## 2021-07-22 PROCEDURE — 36415 COLL VENOUS BLD VENIPUNCTURE: CPT | Performed by: INTERNAL MEDICINE

## 2021-07-22 PROCEDURE — 80202 ASSAY OF VANCOMYCIN: CPT | Performed by: INTERNAL MEDICINE

## 2021-07-26 ENCOUNTER — LAB VISIT (OUTPATIENT)
Dept: LAB | Facility: HOSPITAL | Age: 66
End: 2021-07-26
Attending: INTERNAL MEDICINE
Payer: MEDICARE

## 2021-07-26 DIAGNOSIS — M86.60 CHRONIC OSTEOMYELITIS WITH OR WITHOUT PERIOSTITIS: ICD-10-CM

## 2021-07-26 DIAGNOSIS — L40.3 SAPHO SYNDROME: Primary | ICD-10-CM

## 2021-07-26 DIAGNOSIS — M65.9 SAPHO SYNDROME: Primary | ICD-10-CM

## 2021-07-26 DIAGNOSIS — M86.9 SAPHO SYNDROME: Primary | ICD-10-CM

## 2021-07-26 DIAGNOSIS — L70.9 SAPHO SYNDROME: Primary | ICD-10-CM

## 2021-07-26 DIAGNOSIS — M85.80 SAPHO SYNDROME: Primary | ICD-10-CM

## 2021-07-26 LAB
BUN SERPL-MCNC: 19 MG/DL (ref 8–23)
CREAT SERPL-MCNC: 1.1 MG/DL (ref 0.5–1.4)
EST. GFR  (AFRICAN AMERICAN): >60 ML/MIN/1.73 M^2
EST. GFR  (NON AFRICAN AMERICAN): 52.8 ML/MIN/1.73 M^2
VANCOMYCIN TROUGH SERPL-MCNC: 25 UG/ML (ref 10–22)

## 2021-07-26 PROCEDURE — 82565 ASSAY OF CREATININE: CPT | Performed by: INTERNAL MEDICINE

## 2021-07-26 PROCEDURE — 36415 COLL VENOUS BLD VENIPUNCTURE: CPT | Performed by: INTERNAL MEDICINE

## 2021-07-26 PROCEDURE — 84520 ASSAY OF UREA NITROGEN: CPT | Performed by: INTERNAL MEDICINE

## 2021-07-26 PROCEDURE — 80202 ASSAY OF VANCOMYCIN: CPT | Performed by: INTERNAL MEDICINE

## 2021-07-27 ENCOUNTER — LAB VISIT (OUTPATIENT)
Dept: LAB | Facility: HOSPITAL | Age: 66
End: 2021-07-27
Attending: INTERNAL MEDICINE
Payer: MEDICARE

## 2021-07-27 DIAGNOSIS — M86.60 CHRONIC OSTEOMYELITIS WITH OR WITHOUT PERIOSTITIS: Primary | ICD-10-CM

## 2021-07-27 LAB
BUN SERPL-MCNC: 21 MG/DL (ref 8–23)
CREAT SERPL-MCNC: 0.9 MG/DL (ref 0.5–1.4)
EST. GFR  (AFRICAN AMERICAN): >60 ML/MIN/1.73 M^2
EST. GFR  (NON AFRICAN AMERICAN): >60 ML/MIN/1.73 M^2
VANCOMYCIN TROUGH SERPL-MCNC: 9.5 UG/ML (ref 10–22)

## 2021-07-27 PROCEDURE — 84520 ASSAY OF UREA NITROGEN: CPT | Performed by: INTERNAL MEDICINE

## 2021-07-27 PROCEDURE — 82565 ASSAY OF CREATININE: CPT | Performed by: INTERNAL MEDICINE

## 2021-07-27 PROCEDURE — 36415 COLL VENOUS BLD VENIPUNCTURE: CPT | Performed by: INTERNAL MEDICINE

## 2021-07-27 PROCEDURE — 80202 ASSAY OF VANCOMYCIN: CPT | Performed by: INTERNAL MEDICINE

## 2021-08-02 ENCOUNTER — LAB VISIT (OUTPATIENT)
Dept: LAB | Facility: HOSPITAL | Age: 66
End: 2021-08-02
Attending: INTERNAL MEDICINE
Payer: MEDICARE

## 2021-08-02 DIAGNOSIS — M86.60 CHRONIC OSTEOMYELITIS WITH OR WITHOUT PERIOSTITIS: Primary | ICD-10-CM

## 2021-08-02 LAB
BUN SERPL-MCNC: 18 MG/DL (ref 8–23)
CREAT SERPL-MCNC: 1.1 MG/DL (ref 0.5–1.4)
EST. GFR  (AFRICAN AMERICAN): >60 ML/MIN/1.73 M^2
EST. GFR  (NON AFRICAN AMERICAN): 52.8 ML/MIN/1.73 M^2
VANCOMYCIN TROUGH SERPL-MCNC: 20.8 UG/ML (ref 10–22)

## 2021-08-02 PROCEDURE — 80202 ASSAY OF VANCOMYCIN: CPT | Performed by: INTERNAL MEDICINE

## 2021-08-02 PROCEDURE — 84520 ASSAY OF UREA NITROGEN: CPT | Performed by: INTERNAL MEDICINE

## 2021-08-02 PROCEDURE — 82565 ASSAY OF CREATININE: CPT | Performed by: INTERNAL MEDICINE

## 2021-08-02 PROCEDURE — 36415 COLL VENOUS BLD VENIPUNCTURE: CPT | Performed by: INTERNAL MEDICINE

## 2021-08-09 ENCOUNTER — LAB VISIT (OUTPATIENT)
Dept: LAB | Facility: HOSPITAL | Age: 66
End: 2021-08-09
Payer: MEDICARE

## 2021-08-09 DIAGNOSIS — L03.115 CELLULITIS OF RIGHT FOOT: Primary | ICD-10-CM

## 2021-08-09 LAB
ALBUMIN SERPL BCP-MCNC: 3 G/DL (ref 3.5–5.2)
ALP SERPL-CCNC: 49 U/L (ref 55–135)
ALT SERPL W/O P-5'-P-CCNC: 21 U/L (ref 10–44)
ANION GAP SERPL CALC-SCNC: 7 MMOL/L (ref 8–16)
AST SERPL-CCNC: 12 U/L (ref 10–40)
BASOPHILS # BLD AUTO: 0.04 K/UL (ref 0–0.2)
BASOPHILS NFR BLD: 0.2 % (ref 0–1.9)
BILIRUB SERPL-MCNC: 0.4 MG/DL (ref 0.1–1)
BUN SERPL-MCNC: 21 MG/DL (ref 8–23)
CALCIUM SERPL-MCNC: 8.2 MG/DL (ref 8.7–10.5)
CHLORIDE SERPL-SCNC: 108 MMOL/L (ref 95–110)
CO2 SERPL-SCNC: 26 MMOL/L (ref 23–29)
CREAT SERPL-MCNC: 1.5 MG/DL (ref 0.5–1.4)
CRP SERPL-MCNC: 2.55 MG/DL (ref 0–0.75)
DIFFERENTIAL METHOD: ABNORMAL
EOSINOPHIL # BLD AUTO: 0.4 K/UL (ref 0–0.5)
EOSINOPHIL NFR BLD: 2.5 % (ref 0–8)
ERYTHROCYTE [DISTWIDTH] IN BLOOD BY AUTOMATED COUNT: 12.5 % (ref 11.5–14.5)
ERYTHROCYTE [SEDIMENTATION RATE] IN BLOOD: 23 MM/HR (ref 0–20)
EST. GFR  (AFRICAN AMERICAN): 41.8 ML/MIN/1.73 M^2
EST. GFR  (NON AFRICAN AMERICAN): 36.3 ML/MIN/1.73 M^2
GLUCOSE SERPL-MCNC: 97 MG/DL (ref 70–110)
HCT VFR BLD AUTO: 33.7 % (ref 37–48.5)
HGB BLD-MCNC: 11.1 G/DL (ref 12–16)
IMM GRANULOCYTES # BLD AUTO: 0.18 K/UL (ref 0–0.04)
IMM GRANULOCYTES NFR BLD AUTO: 1 % (ref 0–0.5)
LYMPHOCYTES # BLD AUTO: 3.3 K/UL (ref 1–4.8)
LYMPHOCYTES NFR BLD: 18.6 % (ref 18–48)
MCH RBC QN AUTO: 33.3 PG (ref 27–31)
MCHC RBC AUTO-ENTMCNC: 32.9 G/DL (ref 32–36)
MCV RBC AUTO: 101 FL (ref 82–98)
MONOCYTES # BLD AUTO: 1.2 K/UL (ref 0.3–1)
MONOCYTES NFR BLD: 7 % (ref 4–15)
NEUTROPHILS # BLD AUTO: 12.5 K/UL (ref 1.8–7.7)
NEUTROPHILS NFR BLD: 70.7 % (ref 38–73)
NRBC BLD-RTO: 0 /100 WBC
PLATELET # BLD AUTO: 300 K/UL (ref 150–450)
PMV BLD AUTO: 10 FL (ref 9.2–12.9)
POTASSIUM SERPL-SCNC: 3.5 MMOL/L (ref 3.5–5.1)
PROT SERPL-MCNC: 6.3 G/DL (ref 6–8.4)
RBC # BLD AUTO: 3.33 M/UL (ref 4–5.4)
SODIUM SERPL-SCNC: 141 MMOL/L (ref 136–145)
WBC # BLD AUTO: 17.67 K/UL (ref 3.9–12.7)

## 2021-08-09 PROCEDURE — 80053 COMPREHEN METABOLIC PANEL: CPT

## 2021-08-09 PROCEDURE — 85651 RBC SED RATE NONAUTOMATED: CPT

## 2021-08-09 PROCEDURE — 36415 COLL VENOUS BLD VENIPUNCTURE: CPT

## 2021-08-09 PROCEDURE — 86140 C-REACTIVE PROTEIN: CPT

## 2021-08-09 PROCEDURE — 85025 COMPLETE CBC W/AUTO DIFF WBC: CPT

## 2021-08-13 ENCOUNTER — LAB VISIT (OUTPATIENT)
Dept: LAB | Facility: HOSPITAL | Age: 66
End: 2021-08-13
Payer: MEDICARE

## 2021-08-13 DIAGNOSIS — M86.60 CHRONIC OSTEOMYELITIS WITH OR WITHOUT PERIOSTITIS: ICD-10-CM

## 2021-08-13 DIAGNOSIS — L03.115 CELLULITIS OF RIGHT FOOT: Primary | ICD-10-CM

## 2021-08-13 LAB
BUN SERPL-MCNC: 22 MG/DL (ref 8–23)
CREAT SERPL-MCNC: 2 MG/DL (ref 0.5–1.4)
EST. GFR  (AFRICAN AMERICAN): 29.6 ML/MIN/1.73 M^2
EST. GFR  (NON AFRICAN AMERICAN): 25.6 ML/MIN/1.73 M^2
VANCOMYCIN TROUGH SERPL-MCNC: 46.9 UG/ML (ref 10–22)

## 2021-08-13 PROCEDURE — 80202 ASSAY OF VANCOMYCIN: CPT

## 2021-08-13 PROCEDURE — 36415 COLL VENOUS BLD VENIPUNCTURE: CPT

## 2021-08-13 PROCEDURE — 84520 ASSAY OF UREA NITROGEN: CPT

## 2021-08-13 PROCEDURE — 82565 ASSAY OF CREATININE: CPT

## 2021-08-16 ENCOUNTER — LAB VISIT (OUTPATIENT)
Dept: LAB | Facility: HOSPITAL | Age: 66
End: 2021-08-16
Payer: MEDICARE

## 2021-08-16 DIAGNOSIS — M86.60 CHRONIC OSTEOMYELITIS WITH OR WITHOUT PERIOSTITIS: Primary | ICD-10-CM

## 2021-08-16 DIAGNOSIS — L03.115 CELLULITIS OF RIGHT FOOT: ICD-10-CM

## 2021-08-16 LAB
ALBUMIN SERPL BCP-MCNC: 3.3 G/DL (ref 3.5–5.2)
ALP SERPL-CCNC: 62 U/L (ref 55–135)
ALT SERPL W/O P-5'-P-CCNC: 24 U/L (ref 10–44)
ANION GAP SERPL CALC-SCNC: 6 MMOL/L (ref 8–16)
AST SERPL-CCNC: 11 U/L (ref 10–40)
BASOPHILS # BLD AUTO: 0.12 K/UL (ref 0–0.2)
BASOPHILS NFR BLD: 0.7 % (ref 0–1.9)
BILIRUB SERPL-MCNC: 0.2 MG/DL (ref 0.1–1)
BUN SERPL-MCNC: 23 MG/DL (ref 8–23)
BUN SERPL-MCNC: 23 MG/DL (ref 8–23)
CALCIUM SERPL-MCNC: 8.5 MG/DL (ref 8.7–10.5)
CHLORIDE SERPL-SCNC: 110 MMOL/L (ref 95–110)
CO2 SERPL-SCNC: 23 MMOL/L (ref 23–29)
CREAT SERPL-MCNC: 1.7 MG/DL (ref 0.5–1.4)
CREAT SERPL-MCNC: 1.7 MG/DL (ref 0.5–1.4)
CRP SERPL-MCNC: 1.74 MG/DL (ref 0–0.75)
DIFFERENTIAL METHOD: ABNORMAL
EOSINOPHIL # BLD AUTO: 0.8 K/UL (ref 0–0.5)
EOSINOPHIL NFR BLD: 4.7 % (ref 0–8)
ERYTHROCYTE [DISTWIDTH] IN BLOOD BY AUTOMATED COUNT: 13 % (ref 11.5–14.5)
ERYTHROCYTE [SEDIMENTATION RATE] IN BLOOD: 30 MM/HR (ref 0–20)
EST. GFR  (AFRICAN AMERICAN): 36 ML/MIN/1.73 M^2
EST. GFR  (AFRICAN AMERICAN): 36 ML/MIN/1.73 M^2
EST. GFR  (NON AFRICAN AMERICAN): 31.2 ML/MIN/1.73 M^2
EST. GFR  (NON AFRICAN AMERICAN): 31.2 ML/MIN/1.73 M^2
GLUCOSE SERPL-MCNC: 93 MG/DL (ref 70–110)
HCT VFR BLD AUTO: 34.8 % (ref 37–48.5)
HGB BLD-MCNC: 11.3 G/DL (ref 12–16)
IMM GRANULOCYTES # BLD AUTO: 0.77 K/UL (ref 0–0.04)
IMM GRANULOCYTES NFR BLD AUTO: 4.6 % (ref 0–0.5)
LYMPHOCYTES # BLD AUTO: 4.6 K/UL (ref 1–4.8)
LYMPHOCYTES NFR BLD: 27.5 % (ref 18–48)
MCH RBC QN AUTO: 32.6 PG (ref 27–31)
MCHC RBC AUTO-ENTMCNC: 32.5 G/DL (ref 32–36)
MCV RBC AUTO: 100 FL (ref 82–98)
MONOCYTES # BLD AUTO: 1.8 K/UL (ref 0.3–1)
MONOCYTES NFR BLD: 10.7 % (ref 4–15)
NEUTROPHILS # BLD AUTO: 8.7 K/UL (ref 1.8–7.7)
NEUTROPHILS NFR BLD: 51.8 % (ref 38–73)
NRBC BLD-RTO: 0 /100 WBC
PLATELET # BLD AUTO: 331 K/UL (ref 150–450)
PMV BLD AUTO: 10.2 FL (ref 9.2–12.9)
POTASSIUM SERPL-SCNC: 4.1 MMOL/L (ref 3.5–5.1)
PROT SERPL-MCNC: 7.2 G/DL (ref 6–8.4)
RBC # BLD AUTO: 3.47 M/UL (ref 4–5.4)
SODIUM SERPL-SCNC: 139 MMOL/L (ref 136–145)
VANCOMYCIN TROUGH SERPL-MCNC: 10.2 UG/ML (ref 10–22)
WBC # BLD AUTO: 16.69 K/UL (ref 3.9–12.7)

## 2021-08-16 PROCEDURE — 85651 RBC SED RATE NONAUTOMATED: CPT

## 2021-08-16 PROCEDURE — 86140 C-REACTIVE PROTEIN: CPT

## 2021-08-16 PROCEDURE — 36415 COLL VENOUS BLD VENIPUNCTURE: CPT

## 2021-08-16 PROCEDURE — 80053 COMPREHEN METABOLIC PANEL: CPT

## 2021-08-16 PROCEDURE — 80202 ASSAY OF VANCOMYCIN: CPT

## 2021-08-16 PROCEDURE — 85025 COMPLETE CBC W/AUTO DIFF WBC: CPT

## 2021-08-24 ENCOUNTER — LAB VISIT (OUTPATIENT)
Dept: LAB | Facility: HOSPITAL | Age: 66
End: 2021-08-24
Attending: INTERNAL MEDICINE
Payer: MEDICARE

## 2021-08-24 DIAGNOSIS — M06.9 ATROPHIC ARTHRITIS: ICD-10-CM

## 2021-08-24 DIAGNOSIS — L03.115 CELLULITIS OF RIGHT FOOT: Primary | ICD-10-CM

## 2021-08-24 DIAGNOSIS — I10 ESSENTIAL HYPERTENSION, MALIGNANT: ICD-10-CM

## 2021-08-24 LAB
ALBUMIN SERPL BCP-MCNC: 3.6 G/DL (ref 3.5–5.2)
ALP SERPL-CCNC: 48 U/L (ref 55–135)
ALT SERPL W/O P-5'-P-CCNC: 23 U/L (ref 10–44)
ANION GAP SERPL CALC-SCNC: 7 MMOL/L (ref 8–16)
AST SERPL-CCNC: 13 U/L (ref 10–40)
BASOPHILS # BLD AUTO: 0.09 K/UL (ref 0–0.2)
BASOPHILS NFR BLD: 1.2 % (ref 0–1.9)
BILIRUB SERPL-MCNC: 0.3 MG/DL (ref 0.1–1)
BUN SERPL-MCNC: 19 MG/DL (ref 8–23)
CALCIUM SERPL-MCNC: 9.4 MG/DL (ref 8.7–10.5)
CHLORIDE SERPL-SCNC: 107 MMOL/L (ref 95–110)
CO2 SERPL-SCNC: 25 MMOL/L (ref 23–29)
CREAT SERPL-MCNC: 1.7 MG/DL (ref 0.5–1.4)
CRP SERPL-MCNC: 0.39 MG/DL (ref 0–0.75)
DIFFERENTIAL METHOD: ABNORMAL
EOSINOPHIL # BLD AUTO: 0.9 K/UL (ref 0–0.5)
EOSINOPHIL NFR BLD: 11.7 % (ref 0–8)
ERYTHROCYTE [DISTWIDTH] IN BLOOD BY AUTOMATED COUNT: 13 % (ref 11.5–14.5)
ERYTHROCYTE [SEDIMENTATION RATE] IN BLOOD: 35 MM/HR (ref 0–20)
EST. GFR  (AFRICAN AMERICAN): 36 ML/MIN/1.73 M^2
EST. GFR  (NON AFRICAN AMERICAN): 31.2 ML/MIN/1.73 M^2
GLUCOSE SERPL-MCNC: 91 MG/DL (ref 70–110)
HCT VFR BLD AUTO: 32.7 % (ref 37–48.5)
HGB BLD-MCNC: 10.8 G/DL (ref 12–16)
IMM GRANULOCYTES # BLD AUTO: 0.06 K/UL (ref 0–0.04)
IMM GRANULOCYTES NFR BLD AUTO: 0.8 % (ref 0–0.5)
LYMPHOCYTES # BLD AUTO: 3.1 K/UL (ref 1–4.8)
LYMPHOCYTES NFR BLD: 40.1 % (ref 18–48)
MCH RBC QN AUTO: 32.8 PG (ref 27–31)
MCHC RBC AUTO-ENTMCNC: 33 G/DL (ref 32–36)
MCV RBC AUTO: 99 FL (ref 82–98)
MONOCYTES # BLD AUTO: 0.7 K/UL (ref 0.3–1)
MONOCYTES NFR BLD: 8.5 % (ref 4–15)
NEUTROPHILS # BLD AUTO: 3 K/UL (ref 1.8–7.7)
NEUTROPHILS NFR BLD: 37.7 % (ref 38–73)
NRBC BLD-RTO: 0 /100 WBC
PLATELET # BLD AUTO: 269 K/UL (ref 150–450)
PMV BLD AUTO: 9.6 FL (ref 9.2–12.9)
POTASSIUM SERPL-SCNC: 4.4 MMOL/L (ref 3.5–5.1)
PROT SERPL-MCNC: 6.7 G/DL (ref 6–8.4)
RBC # BLD AUTO: 3.29 M/UL (ref 4–5.4)
SODIUM SERPL-SCNC: 139 MMOL/L (ref 136–145)
VANCOMYCIN TROUGH SERPL-MCNC: 1.2 UG/ML (ref 10–22)
WBC # BLD AUTO: 7.79 K/UL (ref 3.9–12.7)

## 2021-08-24 PROCEDURE — 85651 RBC SED RATE NONAUTOMATED: CPT | Performed by: INTERNAL MEDICINE

## 2021-08-24 PROCEDURE — 80202 ASSAY OF VANCOMYCIN: CPT | Performed by: INTERNAL MEDICINE

## 2021-08-24 PROCEDURE — 36415 COLL VENOUS BLD VENIPUNCTURE: CPT | Performed by: INTERNAL MEDICINE

## 2021-08-24 PROCEDURE — 85025 COMPLETE CBC W/AUTO DIFF WBC: CPT | Performed by: INTERNAL MEDICINE

## 2021-08-24 PROCEDURE — 80053 COMPREHEN METABOLIC PANEL: CPT | Performed by: INTERNAL MEDICINE

## 2021-08-24 PROCEDURE — 86140 C-REACTIVE PROTEIN: CPT | Performed by: INTERNAL MEDICINE

## 2021-09-07 ENCOUNTER — LAB VISIT (OUTPATIENT)
Dept: LAB | Facility: HOSPITAL | Age: 66
End: 2021-09-07
Attending: INTERNAL MEDICINE
Payer: MEDICARE

## 2021-09-07 DIAGNOSIS — Z45.2 FITTING AND ADJUSTMENT OF VASCULAR CATHETER: ICD-10-CM

## 2021-09-07 DIAGNOSIS — M06.9 ATROPHIC ARTHRITIS: ICD-10-CM

## 2021-09-07 DIAGNOSIS — I10 ESSENTIAL HYPERTENSION, MALIGNANT: ICD-10-CM

## 2021-09-07 DIAGNOSIS — L03.115 CELLULITIS OF RIGHT FOOT: Primary | ICD-10-CM

## 2021-09-07 LAB
ALBUMIN SERPL BCP-MCNC: 3.8 G/DL (ref 3.5–5.2)
ALP SERPL-CCNC: 48 U/L (ref 55–135)
ALT SERPL W/O P-5'-P-CCNC: 20 U/L (ref 10–44)
ANION GAP SERPL CALC-SCNC: 7 MMOL/L (ref 8–16)
AST SERPL-CCNC: 10 U/L (ref 10–40)
BASOPHILS # BLD AUTO: 0.06 K/UL (ref 0–0.2)
BASOPHILS NFR BLD: 0.5 % (ref 0–1.9)
BILIRUB SERPL-MCNC: 0.2 MG/DL (ref 0.1–1)
BUN SERPL-MCNC: 27 MG/DL (ref 8–23)
CALCIUM SERPL-MCNC: 9.2 MG/DL (ref 8.7–10.5)
CHLORIDE SERPL-SCNC: 110 MMOL/L (ref 95–110)
CO2 SERPL-SCNC: 23 MMOL/L (ref 23–29)
CREAT SERPL-MCNC: 1.7 MG/DL (ref 0.5–1.4)
CRP SERPL-MCNC: 0.54 MG/DL (ref 0–0.75)
DIFFERENTIAL METHOD: ABNORMAL
EOSINOPHIL # BLD AUTO: 0.4 K/UL (ref 0–0.5)
EOSINOPHIL NFR BLD: 3.7 % (ref 0–8)
ERYTHROCYTE [DISTWIDTH] IN BLOOD BY AUTOMATED COUNT: 12.8 % (ref 11.5–14.5)
ERYTHROCYTE [SEDIMENTATION RATE] IN BLOOD: 20 MM/HR (ref 0–20)
EST. GFR  (AFRICAN AMERICAN): 36 ML/MIN/1.73 M^2
EST. GFR  (NON AFRICAN AMERICAN): 31.2 ML/MIN/1.73 M^2
GLUCOSE SERPL-MCNC: 99 MG/DL (ref 70–110)
HCT VFR BLD AUTO: 29.6 % (ref 37–48.5)
HGB BLD-MCNC: 9.9 G/DL (ref 12–16)
IMM GRANULOCYTES # BLD AUTO: 0.32 K/UL (ref 0–0.04)
IMM GRANULOCYTES NFR BLD AUTO: 2.9 % (ref 0–0.5)
LYMPHOCYTES # BLD AUTO: 4.4 K/UL (ref 1–4.8)
LYMPHOCYTES NFR BLD: 39.5 % (ref 18–48)
MCH RBC QN AUTO: 32.4 PG (ref 27–31)
MCHC RBC AUTO-ENTMCNC: 33.4 G/DL (ref 32–36)
MCV RBC AUTO: 97 FL (ref 82–98)
MONOCYTES # BLD AUTO: 1.7 K/UL (ref 0.3–1)
MONOCYTES NFR BLD: 15.2 % (ref 4–15)
NEUTROPHILS # BLD AUTO: 4.2 K/UL (ref 1.8–7.7)
NEUTROPHILS NFR BLD: 38.2 % (ref 38–73)
NRBC BLD-RTO: 0 /100 WBC
PLATELET # BLD AUTO: 237 K/UL (ref 150–450)
PMV BLD AUTO: 11.1 FL (ref 9.2–12.9)
POTASSIUM SERPL-SCNC: 3.6 MMOL/L (ref 3.5–5.1)
PROT SERPL-MCNC: 7 G/DL (ref 6–8.4)
RBC # BLD AUTO: 3.06 M/UL (ref 4–5.4)
SODIUM SERPL-SCNC: 140 MMOL/L (ref 136–145)
WBC # BLD AUTO: 11.1 K/UL (ref 3.9–12.7)

## 2021-09-07 PROCEDURE — 86140 C-REACTIVE PROTEIN: CPT | Performed by: INTERNAL MEDICINE

## 2021-09-07 PROCEDURE — 85651 RBC SED RATE NONAUTOMATED: CPT | Performed by: INTERNAL MEDICINE

## 2021-09-07 PROCEDURE — 80053 COMPREHEN METABOLIC PANEL: CPT | Performed by: INTERNAL MEDICINE

## 2021-09-07 PROCEDURE — 36415 COLL VENOUS BLD VENIPUNCTURE: CPT | Performed by: INTERNAL MEDICINE

## 2021-09-07 PROCEDURE — 85025 COMPLETE CBC W/AUTO DIFF WBC: CPT | Performed by: INTERNAL MEDICINE

## 2021-09-14 ENCOUNTER — LAB VISIT (OUTPATIENT)
Dept: LAB | Facility: HOSPITAL | Age: 66
End: 2021-09-14
Attending: INTERNAL MEDICINE
Payer: MEDICARE

## 2021-09-14 DIAGNOSIS — I10 ESSENTIAL HYPERTENSION, BENIGN: ICD-10-CM

## 2021-09-14 DIAGNOSIS — M06.9 RHEUMATOID ARTHRITIS INVOLVING BOTH FEET, UNSPECIFIED WHETHER RHEUMATOID FACTOR PRESENT: ICD-10-CM

## 2021-09-14 DIAGNOSIS — L03.115 CELLULITIS OF RIGHT FOOT: Primary | ICD-10-CM

## 2021-09-14 LAB
ALBUMIN SERPL BCP-MCNC: 3.5 G/DL (ref 3.5–5.2)
ALP SERPL-CCNC: 47 U/L (ref 55–135)
ALT SERPL W/O P-5'-P-CCNC: 13 U/L (ref 10–44)
ANION GAP SERPL CALC-SCNC: 6 MMOL/L (ref 8–16)
AST SERPL-CCNC: 11 U/L (ref 10–40)
BASOPHILS # BLD AUTO: 0.04 K/UL (ref 0–0.2)
BASOPHILS NFR BLD: 0.5 % (ref 0–1.9)
BILIRUB SERPL-MCNC: 0.1 MG/DL (ref 0.1–1)
BUN SERPL-MCNC: 19 MG/DL (ref 8–23)
CALCIUM SERPL-MCNC: 9.1 MG/DL (ref 8.7–10.5)
CHLORIDE SERPL-SCNC: 110 MMOL/L (ref 95–110)
CO2 SERPL-SCNC: 24 MMOL/L (ref 23–29)
CREAT SERPL-MCNC: 1.4 MG/DL (ref 0.5–1.4)
CRP SERPL-MCNC: 0.91 MG/DL (ref 0–0.75)
DIFFERENTIAL METHOD: ABNORMAL
EOSINOPHIL # BLD AUTO: 0.2 K/UL (ref 0–0.5)
EOSINOPHIL NFR BLD: 3 % (ref 0–8)
ERYTHROCYTE [DISTWIDTH] IN BLOOD BY AUTOMATED COUNT: 13.5 % (ref 11.5–14.5)
ERYTHROCYTE [SEDIMENTATION RATE] IN BLOOD: 33 MM/HR (ref 0–20)
EST. GFR  (AFRICAN AMERICAN): 45.5 ML/MIN/1.73 M^2
EST. GFR  (NON AFRICAN AMERICAN): 39.5 ML/MIN/1.73 M^2
GLUCOSE SERPL-MCNC: 87 MG/DL (ref 70–110)
HCT VFR BLD AUTO: 29.9 % (ref 37–48.5)
HGB BLD-MCNC: 9.8 G/DL (ref 12–16)
IMM GRANULOCYTES # BLD AUTO: 0.02 K/UL (ref 0–0.04)
IMM GRANULOCYTES NFR BLD AUTO: 0.3 % (ref 0–0.5)
LYMPHOCYTES # BLD AUTO: 4.2 K/UL (ref 1–4.8)
LYMPHOCYTES NFR BLD: 52.3 % (ref 18–48)
MCH RBC QN AUTO: 31.7 PG (ref 27–31)
MCHC RBC AUTO-ENTMCNC: 32.8 G/DL (ref 32–36)
MCV RBC AUTO: 97 FL (ref 82–98)
MONOCYTES # BLD AUTO: 0.9 K/UL (ref 0.3–1)
MONOCYTES NFR BLD: 11.6 % (ref 4–15)
NEUTROPHILS # BLD AUTO: 2.6 K/UL (ref 1.8–7.7)
NEUTROPHILS NFR BLD: 32.3 % (ref 38–73)
NRBC BLD-RTO: 0 /100 WBC
PLATELET # BLD AUTO: 436 K/UL (ref 150–450)
PLATELET BLD QL SMEAR: ABNORMAL
PMV BLD AUTO: 10.4 FL (ref 9.2–12.9)
POTASSIUM SERPL-SCNC: 4 MMOL/L (ref 3.5–5.1)
PROT SERPL-MCNC: 6.5 G/DL (ref 6–8.4)
RBC # BLD AUTO: 3.09 M/UL (ref 4–5.4)
SODIUM SERPL-SCNC: 140 MMOL/L (ref 136–145)
WBC # BLD AUTO: 7.93 K/UL (ref 3.9–12.7)

## 2021-09-14 PROCEDURE — 80053 COMPREHEN METABOLIC PANEL: CPT | Performed by: INTERNAL MEDICINE

## 2021-09-14 PROCEDURE — 36415 COLL VENOUS BLD VENIPUNCTURE: CPT | Performed by: INTERNAL MEDICINE

## 2021-09-14 PROCEDURE — 85025 COMPLETE CBC W/AUTO DIFF WBC: CPT | Performed by: INTERNAL MEDICINE

## 2021-09-14 PROCEDURE — 85651 RBC SED RATE NONAUTOMATED: CPT | Performed by: INTERNAL MEDICINE

## 2021-09-14 PROCEDURE — 86140 C-REACTIVE PROTEIN: CPT | Performed by: INTERNAL MEDICINE

## 2021-09-22 ENCOUNTER — LAB VISIT (OUTPATIENT)
Dept: LAB | Facility: HOSPITAL | Age: 66
End: 2021-09-22
Attending: INTERNAL MEDICINE
Payer: MEDICARE

## 2021-09-22 DIAGNOSIS — L03.115 CELLULITIS OF RIGHT FOOT: Primary | ICD-10-CM

## 2021-09-22 DIAGNOSIS — M06.9 ATROPHIC ARTHRITIS: ICD-10-CM

## 2021-09-22 DIAGNOSIS — Z45.2 FITTING AND ADJUSTMENT OF VASCULAR CATHETER: ICD-10-CM

## 2021-09-22 DIAGNOSIS — I10 ESSENTIAL HYPERTENSION, MALIGNANT: ICD-10-CM

## 2021-09-22 LAB
ALBUMIN SERPL BCP-MCNC: 4.4 G/DL (ref 3.5–5.2)
ALP SERPL-CCNC: 52 U/L (ref 55–135)
ALT SERPL W/O P-5'-P-CCNC: 17 U/L (ref 10–44)
ANION GAP SERPL CALC-SCNC: 8 MMOL/L (ref 8–16)
AST SERPL-CCNC: 12 U/L (ref 10–40)
BASOPHILS # BLD AUTO: 0.04 K/UL (ref 0–0.2)
BASOPHILS NFR BLD: 0.4 % (ref 0–1.9)
BILIRUB SERPL-MCNC: 0.2 MG/DL (ref 0.1–1)
BUN SERPL-MCNC: 20 MG/DL (ref 8–23)
CALCIUM SERPL-MCNC: 9.7 MG/DL (ref 8.7–10.5)
CHLORIDE SERPL-SCNC: 106 MMOL/L (ref 95–110)
CO2 SERPL-SCNC: 23 MMOL/L (ref 23–29)
CREAT SERPL-MCNC: 1.3 MG/DL (ref 0.5–1.4)
CRP SERPL-MCNC: 0.4 MG/DL (ref 0–0.75)
DIFFERENTIAL METHOD: ABNORMAL
EOSINOPHIL # BLD AUTO: 0.2 K/UL (ref 0–0.5)
EOSINOPHIL NFR BLD: 2 % (ref 0–8)
ERYTHROCYTE [DISTWIDTH] IN BLOOD BY AUTOMATED COUNT: 13.3 % (ref 11.5–14.5)
ERYTHROCYTE [SEDIMENTATION RATE] IN BLOOD: 24 MM/HR (ref 0–20)
EST. GFR  (AFRICAN AMERICAN): 49.7 ML/MIN/1.73 M^2
EST. GFR  (NON AFRICAN AMERICAN): 43.2 ML/MIN/1.73 M^2
GLUCOSE SERPL-MCNC: 101 MG/DL (ref 70–110)
HCT VFR BLD AUTO: 34.2 % (ref 37–48.5)
HGB BLD-MCNC: 11.4 G/DL (ref 12–16)
IMM GRANULOCYTES # BLD AUTO: 0.04 K/UL (ref 0–0.04)
IMM GRANULOCYTES NFR BLD AUTO: 0.4 % (ref 0–0.5)
LYMPHOCYTES # BLD AUTO: 4.2 K/UL (ref 1–4.8)
LYMPHOCYTES NFR BLD: 41.1 % (ref 18–48)
MCH RBC QN AUTO: 31.8 PG (ref 27–31)
MCHC RBC AUTO-ENTMCNC: 33.3 G/DL (ref 32–36)
MCV RBC AUTO: 95 FL (ref 82–98)
MONOCYTES # BLD AUTO: 1 K/UL (ref 0.3–1)
MONOCYTES NFR BLD: 9.3 % (ref 4–15)
NEUTROPHILS # BLD AUTO: 4.8 K/UL (ref 1.8–7.7)
NEUTROPHILS NFR BLD: 46.8 % (ref 38–73)
NRBC BLD-RTO: 0 /100 WBC
PLATELET # BLD AUTO: 381 K/UL (ref 150–450)
PMV BLD AUTO: 10.5 FL (ref 9.2–12.9)
POTASSIUM SERPL-SCNC: 4.1 MMOL/L (ref 3.5–5.1)
PROT SERPL-MCNC: 7.7 G/DL (ref 6–8.4)
RBC # BLD AUTO: 3.59 M/UL (ref 4–5.4)
SODIUM SERPL-SCNC: 137 MMOL/L (ref 136–145)
WBC # BLD AUTO: 10.26 K/UL (ref 3.9–12.7)

## 2021-09-22 PROCEDURE — 85025 COMPLETE CBC W/AUTO DIFF WBC: CPT | Performed by: INTERNAL MEDICINE

## 2021-09-22 PROCEDURE — 86140 C-REACTIVE PROTEIN: CPT | Performed by: INTERNAL MEDICINE

## 2021-09-22 PROCEDURE — 85651 RBC SED RATE NONAUTOMATED: CPT | Performed by: INTERNAL MEDICINE

## 2021-09-22 PROCEDURE — 80053 COMPREHEN METABOLIC PANEL: CPT | Performed by: INTERNAL MEDICINE

## 2021-09-22 PROCEDURE — 36415 COLL VENOUS BLD VENIPUNCTURE: CPT | Performed by: INTERNAL MEDICINE

## 2021-09-30 ENCOUNTER — LAB VISIT (OUTPATIENT)
Dept: LAB | Facility: HOSPITAL | Age: 66
End: 2021-09-30
Attending: OTOLARYNGOLOGY
Payer: MEDICARE

## 2021-09-30 DIAGNOSIS — L02.415 ABSCESS OF SKIN OF RIGHT ANKLE: Primary | ICD-10-CM

## 2021-09-30 LAB
ALBUMIN SERPL BCP-MCNC: 4.1 G/DL (ref 3.5–5.2)
ALP SERPL-CCNC: 48 U/L (ref 55–135)
ALT SERPL W/O P-5'-P-CCNC: 16 U/L (ref 10–44)
ANION GAP SERPL CALC-SCNC: 4 MMOL/L (ref 8–16)
AST SERPL-CCNC: 12 U/L (ref 10–40)
BASOPHILS # BLD AUTO: 0.06 K/UL (ref 0–0.2)
BASOPHILS NFR BLD: 0.5 % (ref 0–1.9)
BILIRUB SERPL-MCNC: 0.2 MG/DL (ref 0.1–1)
BUN SERPL-MCNC: 32 MG/DL (ref 8–23)
CALCIUM SERPL-MCNC: 9.5 MG/DL (ref 8.7–10.5)
CHLORIDE SERPL-SCNC: 107 MMOL/L (ref 95–110)
CO2 SERPL-SCNC: 27 MMOL/L (ref 23–29)
CREAT SERPL-MCNC: 1.6 MG/DL (ref 0.5–1.4)
CRP SERPL-MCNC: 0.44 MG/DL (ref 0–0.75)
DIFFERENTIAL METHOD: ABNORMAL
EOSINOPHIL # BLD AUTO: 0.2 K/UL (ref 0–0.5)
EOSINOPHIL NFR BLD: 1.5 % (ref 0–8)
ERYTHROCYTE [DISTWIDTH] IN BLOOD BY AUTOMATED COUNT: 13.1 % (ref 11.5–14.5)
ERYTHROCYTE [SEDIMENTATION RATE] IN BLOOD: 23 MM/HR (ref 0–20)
EST. GFR  (AFRICAN AMERICAN): 38.7 ML/MIN/1.73 M^2
EST. GFR  (NON AFRICAN AMERICAN): 33.6 ML/MIN/1.73 M^2
GLUCOSE SERPL-MCNC: 116 MG/DL (ref 70–110)
HCT VFR BLD AUTO: 35.3 % (ref 37–48.5)
HGB BLD-MCNC: 11.8 G/DL (ref 12–16)
IMM GRANULOCYTES # BLD AUTO: 0.05 K/UL (ref 0–0.04)
IMM GRANULOCYTES NFR BLD AUTO: 0.4 % (ref 0–0.5)
LYMPHOCYTES # BLD AUTO: 3.8 K/UL (ref 1–4.8)
LYMPHOCYTES NFR BLD: 28.3 % (ref 18–48)
MAGNESIUM SERPL-MCNC: 2 MG/DL (ref 1.6–2.6)
MCH RBC QN AUTO: 31.6 PG (ref 27–31)
MCHC RBC AUTO-ENTMCNC: 33.4 G/DL (ref 32–36)
MCV RBC AUTO: 94 FL (ref 82–98)
MONOCYTES # BLD AUTO: 0.9 K/UL (ref 0.3–1)
MONOCYTES NFR BLD: 7 % (ref 4–15)
NEUTROPHILS # BLD AUTO: 8.2 K/UL (ref 1.8–7.7)
NEUTROPHILS NFR BLD: 62.3 % (ref 38–73)
NRBC BLD-RTO: 0 /100 WBC
PHOSPHATE SERPL-MCNC: 3.6 MG/DL (ref 2.7–4.5)
PLATELET # BLD AUTO: 296 K/UL (ref 150–450)
PMV BLD AUTO: 9.9 FL (ref 9.2–12.9)
POTASSIUM SERPL-SCNC: 3.5 MMOL/L (ref 3.5–5.1)
PROT SERPL-MCNC: 8 G/DL (ref 6–8.4)
RBC # BLD AUTO: 3.74 M/UL (ref 4–5.4)
SODIUM SERPL-SCNC: 138 MMOL/L (ref 136–145)
WBC # BLD AUTO: 13.23 K/UL (ref 3.9–12.7)

## 2021-09-30 PROCEDURE — 83735 ASSAY OF MAGNESIUM: CPT | Performed by: OTOLARYNGOLOGY

## 2021-09-30 PROCEDURE — 84100 ASSAY OF PHOSPHORUS: CPT | Performed by: OTOLARYNGOLOGY

## 2021-09-30 PROCEDURE — 80053 COMPREHEN METABOLIC PANEL: CPT | Performed by: OTOLARYNGOLOGY

## 2021-09-30 PROCEDURE — 85651 RBC SED RATE NONAUTOMATED: CPT | Performed by: OTOLARYNGOLOGY

## 2021-09-30 PROCEDURE — 86140 C-REACTIVE PROTEIN: CPT | Performed by: OTOLARYNGOLOGY

## 2021-09-30 PROCEDURE — 85025 COMPLETE CBC W/AUTO DIFF WBC: CPT | Performed by: OTOLARYNGOLOGY

## 2021-09-30 PROCEDURE — 36415 COLL VENOUS BLD VENIPUNCTURE: CPT | Performed by: OTOLARYNGOLOGY

## 2021-10-28 DIAGNOSIS — Z12.31 SCREENING MAMMOGRAM, ENCOUNTER FOR: Primary | ICD-10-CM

## 2021-11-15 ENCOUNTER — HOSPITAL ENCOUNTER (OUTPATIENT)
Dept: RADIOLOGY | Facility: HOSPITAL | Age: 66
Discharge: HOME | End: 2021-11-15
Attending: FAMILY MEDICINE
Payer: MEDICARE

## 2021-11-15 VITALS — WEIGHT: 110 LBS | HEIGHT: 62 IN | BODY MASS INDEX: 20.24 KG/M2

## 2021-11-15 DIAGNOSIS — Z12.31 SCREENING MAMMOGRAM, ENCOUNTER FOR: ICD-10-CM

## 2021-11-15 PROCEDURE — 77067 SCR MAMMO BI INCL CAD: CPT | Mod: TC

## 2022-03-08 DIAGNOSIS — Z12.11 ENCOUNTER FOR SCREENING COLONOSCOPY FOR NON-HIGH-RISK PATIENT: Primary | ICD-10-CM

## 2022-03-08 RX ORDER — SODIUM CHLORIDE 9 MG/ML
INJECTION, SOLUTION INTRAVENOUS CONTINUOUS
Status: CANCELLED | OUTPATIENT
Start: 2022-03-08

## 2022-03-08 RX ORDER — LIDOCAINE HYDROCHLORIDE 10 MG/ML
1 INJECTION, SOLUTION EPIDURAL; INFILTRATION; INTRACAUDAL; PERINEURAL ONCE
Status: CANCELLED | OUTPATIENT
Start: 2022-03-08 | End: 2022-03-08

## 2022-03-08 RX ORDER — SODIUM CHLORIDE 0.9 % (FLUSH) 0.9 %
10 SYRINGE (ML) INJECTION
Status: CANCELLED | OUTPATIENT
Start: 2022-03-08

## 2022-03-16 NOTE — DISCHARGE INSTRUCTIONS
BEFORE THE PROCEDURE:    REPORT ANY CHANGE IN YOUR PHYSICAL CONDITION TO YOUR DOCTOR IMMEDIATELY.  SELF ISOLATE AND CHECK TEMPERATURE DAILY, IF TEMP OVER 100, CALL PHYSICIAN IMMEDIATELY.  TRY TO REFRAIN FROM SMOKING AND ALCOHOL 72 HOURS BEFORE YOUR PROCEDURE.   DO NOT EAT OR DRINK ANYTHING AFTER MIDNIGHT THE NIGHT BEFORE YOUR PROCEDURE.  NO MAKE UP, NAIL POLISH OR JEWELRY.      SOMEONE WILL CALL YOU THE DAY BEFORE YOUR PROCEDURE WITH A CHECK-IN TIME FOR YOUR PROCEDURE.    DAY OF YOUR PROCEDURE:    TAKE BLOOD PRESSURE MEDICATIONS THE MORNING OF YOUR PROCEDURE, WITH SMALL SIPS WATER, AS DIRECTED BY YOUR PHYSICIAN.   DO NOT TAKE ANY DIABETIC MEDICATIONS UNLESS DIRECTED TO DO SO BY YOUR PHYSICIAN.   CONTACT LENSES AND DENTURES MUST BE REMOVED.  A RESPONSIBLE ADULT MUST ACCOMPANY YOU HOME UPON DISCHARGE.   ONLY 1 VISITOR ALLOWED PER ROOM.         YOUR THOUGHTS AND OPINIONS HELP US TO BETTER SERVE YOU.     PLEASE PARTICIPATE IN SURVEYS ABOUT YOUR CARE.    THANK YOU FOR CHOOSING [facility].

## 2022-03-17 ENCOUNTER — HOSPITAL ENCOUNTER (OUTPATIENT)
Dept: PREADMISSION TESTING | Facility: HOSPITAL | Age: 67
Discharge: HOME | End: 2022-03-17
Attending: SURGERY
Payer: MEDICARE

## 2022-03-17 ENCOUNTER — HOSPITAL ENCOUNTER (OUTPATIENT)
Dept: PULMONOLOGY | Facility: HOSPITAL | Age: 67
Discharge: HOME | End: 2022-03-17
Attending: SURGERY
Payer: MEDICARE

## 2022-03-17 VITALS — BODY MASS INDEX: 21.16 KG/M2 | WEIGHT: 115 LBS | HEIGHT: 62 IN

## 2022-03-17 DIAGNOSIS — Z12.11 ENCOUNTER FOR SCREENING COLONOSCOPY FOR NON-HIGH-RISK PATIENT: ICD-10-CM

## 2022-03-17 PROCEDURE — 93010 ELECTROCARDIOGRAM REPORT: CPT | Mod: ,,, | Performed by: INTERNAL MEDICINE

## 2022-03-17 PROCEDURE — 93010 EKG 12-LEAD: ICD-10-PCS | Mod: ,,, | Performed by: INTERNAL MEDICINE

## 2022-03-17 PROCEDURE — 93005 ELECTROCARDIOGRAM TRACING: CPT

## 2022-03-17 RX ORDER — LEFLUNOMIDE 10 MG/1
20 TABLET ORAL DAILY
COMMUNITY

## 2022-03-17 RX ORDER — PANTOPRAZOLE SODIUM 40 MG/1
40 TABLET, DELAYED RELEASE ORAL 2 TIMES DAILY
COMMUNITY

## 2022-03-17 NOTE — PRE-PROCEDURE INSTRUCTIONS
DR. HAJI CARDIAC CLEARANCE ON CHART DATED 03/16/2022    LAB ALESSIA RESULTS ON CHART DATED 02/08/2022

## 2022-03-22 ENCOUNTER — ANESTHESIA EVENT (OUTPATIENT)
Dept: ENDOSCOPY | Facility: HOSPITAL | Age: 67
End: 2022-03-22
Payer: MEDICARE

## 2022-03-22 NOTE — ANESTHESIA PREPROCEDURE EVALUATION
03/22/2022  Tiffani Wheeler is a 66 y.o., female.      Pre-op Assessment    I have reviewed the Patient Summary Reports.    I have reviewed the NPO Status.   I have reviewed the Medications.     Review of Systems  Anesthesia Hx:  No problems with previous Anesthesia Hx of Anesthetic complications  Denies Family Hx of Anesthesia complications.  Personal Hx of Anesthesia complications  Difficult Intubation, glidescope used successfully, according to patient history   Social:  Non-Smoker    Cardiovascular:   Hypertension, well controlled JENKINS ECG has been reviewed.    Pulmonary:   Asthma asymptomatic Shortness of breath HX COVID   Renal/:  Renal/ Normal     Hepatic/GI:   GERD, well controlled    Musculoskeletal:   Arthritis     Neurological:   Neuromuscular Disease, Headaches Migranes,spondyolitis,raynauds   Endocrine:   Hypothyroidism    Hematocrit 42 platelet count 360 creatinine 1.2 sodium 140 potassium 4.3  Lab Results   Component Value Date    WBC 13.23 (H) 09/30/2021    HGB 11.8 (L) 09/30/2021    HCT 35.3 (L) 09/30/2021    MCV 94 09/30/2021     09/30/2021         Physical Exam  General: Well nourished    Airway:  Mallampati: III / III  Mouth Opening: Normal  TM Distance: Normal  Tongue: Normal  Neck ROM: Normal ROM    Chest/Lungs:  Clear to auscultation    Heart:  Rate: Normal  Rhythm: Regular Rhythm  Sounds: Normal        Anesthesia Plan  Type of Anesthesia, risks & benefits discussed:    Anesthesia Type: MAC  Intra-op Monitoring Plan: Standard ASA Monitors  Post Op Pain Control Plan: multimodal analgesia  Induction:  IV  Airway Plan: Direct  Informed Consent: Informed consent signed with the Patient and all parties understand the risks and agree with anesthesia plan.  All questions answered.   ASA Score: 4  Day of Surgery Review of History & Physical: I have interviewed and examined the  patient. I have reviewed the patient's H&P dated: There are no significant changes.     Ready For Surgery From Anesthesia Perspective.     .

## 2022-03-23 PROBLEM — Z12.11 ENCOUNTER FOR SCREENING COLONOSCOPY: Status: ACTIVE | Noted: 2022-03-23

## 2022-03-23 NOTE — H&P
[facility]  General Surgery  History & Physical    Patient Name: Tiffani Wheeler  MRN: [id number]  Admission Date: (Not on file)  Attending Physician: Eligio Espinosa MD   Primary Care Provider: Jc Cho MD    Patient information was obtained from patient and past medical records.     Subjective:     Chief Complaint/Reason for Admission:  Screening colonoscopy    History of Present Illness:  Patient is a 66 y.o. female who is here for a screening colonoscopy.  The last 1 she had was in 2006 and it was unremarkable.  The patient denies any symptoms however during the interview, she gives me a history of a grandmother and an aunt with colon cancers however her parents or siblings or children do not have any history of it.    Current Facility-Administered Medications on File Prior to Encounter   Medication    0.9%  NaCl infusion    lidocaine (PF) 10 mg/ml (1%) injection 10 mg    lidocaine (PF) 10 mg/ml (1%) injection 10 mg    sodium chloride 0.9% flush 10 mL     Current Outpatient Medications on File Prior to Encounter   Medication Sig    amlodipine (NORVASC) 5 MG tablet Take 5 mg by mouth once daily.    benazepril-hydrochlorthiazide (LOTENSIN HCT) 10-12.5 mg Tab Take 1 tablet by mouth once daily.    bisoprolol (ZEBETA) 5 MG tablet Take 5 mg by mouth once daily.    cloNIDine (CATAPRES) 0.2 MG tablet Take 0.1 mg by mouth every evening.    estradiol (ESTRACE) 2 MG tablet Take 2 mg by mouth every evening.    pregabalin (LYRICA) 50 MG capsule Take 50 mg by mouth every evening.    spironolactone (ALDACTONE) 25 MG tablet Take 25 mg by mouth once daily.    tramadol (ULTRAM-ER) 300 MG Tb24 Take 300 mg by mouth daily as needed.       Review of patient's allergies indicates:   Allergen Reactions    Cefepime Other (See Comments)     LUDY KULDEEP SYNDROME       Past Medical History:   Diagnosis Date    Arthritis     rheumotoid arthritis    Asthma     Cataract     COVID 12/26/2021     Difficult intubation     JENKINS (dyspnea on exertion)     GERD (gastroesophageal reflux disease)     Hypertension     Migraine     Raynaud's disease     Right ankle injury 03/2022    BLISTER    Spondylitis     Thyroid disease     enlarged      Past Surgical History:   Procedure Laterality Date    APPENDECTOMY      BLADDER SUSPENSION      CHOLECYSTECTOMY      HYSTERECTOMY      INJECTION OF ANESTHETIC AGENT AROUND MEDIAL BRANCH NERVES INNERVATING LUMBAR FACET JOINT Left 11/22/2019    Procedure: BLOCK, NERVE, FACET JOINT, LUMBAR, MEDIAL BRANCH;  Surgeon: Beau Butler MD;  Location: [facility] OR;  Service: Pain Management;  Laterality: Left;    INJECTION OF ANESTHETIC AGENT AROUND MEDIAL BRANCH NERVES INNERVATING LUMBAR FACET JOINT Left 12/20/2019    Procedure: BLOCK, NERVE, FACET JOINT, LUMBAR, MEDIAL BRANCH;  Surgeon: Beau Butler MD;  Location: [facility] OR;  Service: Pain Management;  Laterality: Left;    OOPHORECTOMY      PHACOEMULSIFICATION OF CATARACT Right 8/18/2020    Procedure: PHACOEMULSIFICATION, CATARACT w/iol W/FEMTO LASER;  Surgeon: Bill Mosqueda MD;  Location: [facility] OR;  Service: Ophthalmology;  Laterality: Right;    RADIOFREQUENCY THERMOCOAGULATION Bilateral 1/17/2020    Procedure: RADIOFREQUENCY THERMAL COAGULATION;  Surgeon: Beau uBtler MD;  Location: [facility] OR;  Service: Pain Management;  Laterality: Bilateral;     Family History     Problem Relation (Age of Onset)    Arthritis Sister    Breast cancer Daughter, Paternal Aunt, Paternal Grandmother    Cancer Father (89)    Diabetes Mother    Gout Sister    Heart disease Mother, Father    Hypertension Mother (73), Sister    No Known Problems Maternal Aunt, Maternal Uncle, Paternal Uncle, Maternal Grandmother, Maternal Grandfather, Paternal Grandfather        Tobacco Use    Smoking status: Never Smoker    Smokeless tobacco: Never Used   Substance and Sexual Activity    Alcohol use: No    Drug use: No    Sexual  activity: Yes     Partners: Male     Review of Systems   Gastrointestinal: Negative for abdominal distention, abdominal pain and anal bleeding.   All other systems reviewed and are negative.    Objective:     Vital Signs (Most Recent):    Vital Signs (24h Range):           There is no height or weight on file to calculate BMI.    Physical Exam  Constitutional:       Appearance: Normal appearance.   HENT:      Head: Normocephalic.      Nose: Nose normal.      Mouth/Throat:      Mouth: Mucous membranes are moist.   Eyes:      Extraocular Movements: Extraocular movements intact.   Cardiovascular:      Rate and Rhythm: Normal rate and regular rhythm.   Pulmonary:      Effort: Pulmonary effort is normal.      Breath sounds: Normal breath sounds.   Abdominal:      General: Abdomen is flat. There is no distension.      Palpations: Abdomen is soft. There is no mass.      Tenderness: There is no abdominal tenderness.   Musculoskeletal:         General: Normal range of motion.      Cervical back: Neck supple.   Lymphadenopathy:      Cervical: No cervical adenopathy.   Skin:     General: Skin is warm and dry.      Coloration: Skin is not jaundiced.   Neurological:      General: No focal deficit present.      Mental Status: She is alert and oriented to person, place, and time.   Psychiatric:         Mood and Affect: Mood normal.         Behavior: Behavior normal.         Significant Labs:  I have reviewed all pertinent lab results within the past 24 hours.    Significant Diagnostics:  I have reviewed all pertinent imaging results/findings within the past 24 hours.    Assessment/Plan:     Active Diagnoses:    Diagnosis Date Noted POA    PRINCIPAL PROBLEM:  Encounter for screening colonoscopy [Z12.11] 03/23/2022 Not Applicable      Problems Resolved During this Admission:     VTE Risk Mitigation (From admission, onward)    None          Eligio Espinosa MD  General Surgery  [facility]

## 2022-03-24 ENCOUNTER — ANESTHESIA (OUTPATIENT)
Dept: ENDOSCOPY | Facility: HOSPITAL | Age: 67
End: 2022-03-24
Payer: MEDICARE

## 2022-03-24 ENCOUNTER — HOSPITAL ENCOUNTER (OUTPATIENT)
Facility: HOSPITAL | Age: 67
Discharge: HOME | End: 2022-03-24
Attending: SURGERY | Admitting: SURGERY
Payer: MEDICARE

## 2022-03-24 VITALS
TEMPERATURE: 98 F | HEART RATE: 73 BPM | RESPIRATION RATE: 18 BRPM | DIASTOLIC BLOOD PRESSURE: 51 MMHG | OXYGEN SATURATION: 96 % | SYSTOLIC BLOOD PRESSURE: 110 MMHG

## 2022-03-24 DIAGNOSIS — Z12.11 ENCOUNTER FOR SCREENING COLONOSCOPY FOR NON-HIGH-RISK PATIENT: ICD-10-CM

## 2022-03-24 DIAGNOSIS — K57.30 SIGMOID DIVERTICULOSIS: Chronic | ICD-10-CM

## 2022-03-24 DIAGNOSIS — Z12.11 ENCOUNTER FOR SCREENING COLONOSCOPY: Primary | ICD-10-CM

## 2022-03-24 PROCEDURE — 37000009 HC ANESTHESIA EA ADD 15 MINS: Performed by: SURGERY

## 2022-03-24 PROCEDURE — 45378 DIAGNOSTIC COLONOSCOPY: CPT | Performed by: SURGERY

## 2022-03-24 PROCEDURE — 25000003 PHARM REV CODE 250: Performed by: SURGERY

## 2022-03-24 PROCEDURE — 37000008 HC ANESTHESIA 1ST 15 MINUTES: Performed by: SURGERY

## 2022-03-24 RX ORDER — LIDOCAINE HYDROCHLORIDE 10 MG/ML
INJECTION, SOLUTION INTRAVENOUS
Status: DISCONTINUED | OUTPATIENT
Start: 2022-03-24 | End: 2022-03-24

## 2022-03-24 RX ORDER — PROPOFOL 10 MG/ML
VIAL (ML) INTRAVENOUS
Status: DISCONTINUED | OUTPATIENT
Start: 2022-03-24 | End: 2022-03-24

## 2022-03-24 RX ORDER — LIDOCAINE HYDROCHLORIDE 10 MG/ML
1 INJECTION, SOLUTION EPIDURAL; INFILTRATION; INTRACAUDAL; PERINEURAL ONCE
Status: DISCONTINUED | OUTPATIENT
Start: 2022-03-24 | End: 2022-03-24 | Stop reason: HOSPADM

## 2022-03-24 RX ORDER — SODIUM CHLORIDE 9 MG/ML
INJECTION, SOLUTION INTRAVENOUS CONTINUOUS
Status: DISCONTINUED | OUTPATIENT
Start: 2022-03-24 | End: 2022-03-24 | Stop reason: HOSPADM

## 2022-03-24 RX ORDER — SODIUM CHLORIDE 0.9 % (FLUSH) 0.9 %
10 SYRINGE (ML) INJECTION
Status: DISCONTINUED | OUTPATIENT
Start: 2022-03-24 | End: 2022-03-24 | Stop reason: HOSPADM

## 2022-03-24 RX ADMIN — Medication 30 MG: at 07:03

## 2022-03-24 RX ADMIN — SODIUM CHLORIDE: 0.9 INJECTION, SOLUTION INTRAVENOUS at 06:03

## 2022-03-24 RX ADMIN — Medication 100 MG: at 07:03

## 2022-03-24 RX ADMIN — LIDOCAINE HYDROCHLORIDE 50 MG: 10 INJECTION, SOLUTION INTRAVENOUS at 07:03

## 2022-03-24 NOTE — DISCHARGE SUMMARY
[facility]  Discharge Note  Short Stay    Procedure(s) (LRB):  COLONOSCOPY (N/A)    OUTCOME: Patient tolerated treatment/procedure well without complication and is now ready for discharge.    DISPOSITION: Home or Self Care    FINAL DIAGNOSIS:  1. Encounter for screening colonoscopy 2. Sigmoid diverticulosis    FOLLOWUP: Follow-up in clinic in 10 years for another colonoscopy    DISCHARGE INSTRUCTIONS:  No discharge procedures on file.     TIME SPENT ON DISCHARGE:  15 minutes

## 2022-03-24 NOTE — ANESTHESIA POSTPROCEDURE EVALUATION
Anesthesia Post Evaluation    Patient: Tiffani Wheeler    Procedure(s) Performed: Procedure(s) (LRB):  COLONOSCOPY (N/A)    Final Anesthesia Type: MAC      Patient location during evaluation: OPS  Patient participation: Yes- Able to Participate  Level of consciousness: awake  Post-procedure vital signs: reviewed and stable  Pain management: adequate  Airway patency: patent    PONV status at discharge: No PONV  Anesthetic complications: no      Cardiovascular status: blood pressure returned to baseline  Respiratory status: spontaneous ventilation  Hydration status: euvolemic  Follow-up not needed.          Vitals Value Taken Time   /51 03/24/22 0802   Temp 36.6 °C (97.9 °F) 03/24/22 0802   Pulse 73 03/24/22 0802   Resp 18 03/24/22 0802   SpO2 96 % 03/24/22 0802         No case tracking events are documented in the log.      Pain/Lux Score: Lux Score: 9 (3/24/2022  7:47 AM)

## 2022-03-24 NOTE — OP NOTE
[facility]  Colonoscopy Procedure  Operative Note    SUMMARY     Date of Procedure: 3/24/2022     Procedure: Procedure(s) (LRB):  COLONOSCOPY (N/A)    Surgeon(s) and Role:     * Eligio Espinosa MD - Primary    Assisting Surgeon: None     Patient location: PACU    Pre-Operative Diagnosis: Encounter for screening colonoscopy for non-high-risk patient [Z12.11]    Post-Operative Diagnosis: Post-Op Diagnosis Codes:     * Encounter for screening colonoscopy for non-high-risk patient [Z12.11]     * Diverticulosis of sigmoid colon [K57.30]     Indications:  Screening colonoscopy     Anesthesia:  Mac        Procedure:                  The patient was brought in to the endoscopy suite where the risks, benefits, and alternatives of the procedure were described.  The patient was given the opportunity to ask questions and then signed informed consent.  Patient was positioned in the left lateral decubitus position, continuous monitoring was initiated, and supplemental oxygen was provided via nasal cannula.  Adequate sedation was achieved with the above mentioned medications and then titrated during the entire procedure.  Digital rectal exam was performed.  Under direct visualization the colonoscope was introduced through the anus in to the rectum.  The scope was then advanced to the terminal ileum.  Scope was then withdrawn and the mucosa was carefully examined in a circular fashion.  The entire colonic mucosa was examined, including the rectum with retroflexion.  Air was evacuated from the colon and the procedure was terminated.  The patient tolerated the procedure well and was able to be transferred to the recovery area in stable condition.    Findings:                 Digital rectal examination:  Rectal exam showed normal sphincter tone with no masses.  There was no blood on the glove.                      Rectum:  Rectal mucosa appeared unremarkable.                    Sigmoid:  Sigmoid colon had evidence of  diverticulosis but no other abnormalities.        Descending:  Descending colon was unremarkable         Transverse:  Transverse colon mucosa was normal         Ascending:  Ascending colon showed no abnormalities        Cecum:  Cecal mucosa appeared unremarkable.  Ileocecal valve and appendiceal orifice were normal.  We entered into the terminal ileum.        Terminal Ileum:  Terminal ileum mucosa was normal     Specimens:   Specimens (From admission, onward)    None            Estimated Blood Loss (EBL): * No values recorded between 3/24/2022 12:00 AM and 3/24/2022  7:44 AM *     Complications: No     Diagnostic Impression:  1. Screening colonoscopy 2. Sigmoid diverticulosis     Recommendations: Discharge patient to home.    Disposition: PACU - hemodynamically stable.     Attestation: I performed the procedure.        Follow Up:             No future appointments.        Eligio Espinosa MD  3/24/2022

## 2022-03-24 NOTE — TRANSFER OF CARE
Anesthesia Transfer of Care Note    Patient: Tiffani Wheeler    Procedure(s) Performed: Procedure(s) (LRB):  COLONOSCOPY (N/A)    Patient location: GI    Anesthesia Type: MAC    Transport from OR: Transported from OR on room air with adequate spontaneous ventilation    Post pain: adequate analgesia    Post assessment: no apparent anesthetic complications    Post vital signs: stable    Level of consciousness: awake    Nausea/Vomiting: no nausea/vomiting    Complications: none    Transfer of care protocol was followed      Last vitals:   HR 70  SPO2 100  RR 16  T 36.7  BP 84/44

## 2022-03-24 NOTE — DISCHARGE INSTRUCTIONS
No d riving or alcohol 24 hours after procedure   follow up in 10 years  for repeat colonoscopyhigh

## 2022-05-09 ENCOUNTER — LAB VISIT (OUTPATIENT)
Dept: LAB | Facility: HOSPITAL | Age: 67
End: 2022-05-09
Attending: INTERNAL MEDICINE
Payer: MEDICARE

## 2022-05-09 DIAGNOSIS — E78.5 HYPERLIPEMIA: ICD-10-CM

## 2022-05-09 DIAGNOSIS — I10 ESSENTIAL HYPERTENSION, MALIGNANT: Primary | ICD-10-CM

## 2022-05-09 LAB
ALBUMIN SERPL BCP-MCNC: 3.5 G/DL (ref 3.5–5.2)
ALP SERPL-CCNC: 44 U/L (ref 55–135)
ALT SERPL W/O P-5'-P-CCNC: 17 U/L (ref 10–44)
ANION GAP SERPL CALC-SCNC: 6 MMOL/L (ref 8–16)
AST SERPL-CCNC: 10 U/L (ref 10–40)
BILIRUB SERPL-MCNC: 0.2 MG/DL (ref 0.1–1)
BUN SERPL-MCNC: 35 MG/DL (ref 8–23)
CALCIUM SERPL-MCNC: 8 MG/DL (ref 8.7–10.5)
CHLORIDE SERPL-SCNC: 113 MMOL/L (ref 95–110)
CHOLEST SERPL-MCNC: 166 MG/DL (ref 120–199)
CHOLEST/HDLC SERPL: 4.4 {RATIO} (ref 2–5)
CO2 SERPL-SCNC: 22 MMOL/L (ref 23–29)
CREAT SERPL-MCNC: 1.9 MG/DL (ref 0.5–1.4)
EST. GFR  (AFRICAN AMERICAN): 31.2 ML/MIN/1.73 M^2
EST. GFR  (NON AFRICAN AMERICAN): 27.1 ML/MIN/1.73 M^2
GLUCOSE SERPL-MCNC: 112 MG/DL (ref 70–110)
HDLC SERPL-MCNC: 38 MG/DL (ref 40–75)
HDLC SERPL: 22.9 % (ref 20–50)
LDLC SERPL CALC-MCNC: 92.4 MG/DL (ref 63–159)
NONHDLC SERPL-MCNC: 128 MG/DL
POTASSIUM SERPL-SCNC: 3.1 MMOL/L (ref 3.5–5.1)
PROT SERPL-MCNC: 7.7 G/DL (ref 6–8.4)
SODIUM SERPL-SCNC: 141 MMOL/L (ref 136–145)
TRIGL SERPL-MCNC: 178 MG/DL (ref 30–150)

## 2022-05-09 PROCEDURE — 36415 COLL VENOUS BLD VENIPUNCTURE: CPT | Performed by: INTERNAL MEDICINE

## 2022-05-09 PROCEDURE — 80061 LIPID PANEL: CPT | Performed by: INTERNAL MEDICINE

## 2022-05-09 PROCEDURE — 80053 COMPREHEN METABOLIC PANEL: CPT | Performed by: INTERNAL MEDICINE

## 2022-05-16 ENCOUNTER — LAB VISIT (OUTPATIENT)
Dept: LAB | Facility: HOSPITAL | Age: 67
End: 2022-05-16
Attending: INTERNAL MEDICINE
Payer: MEDICARE

## 2022-05-16 DIAGNOSIS — E87.5 HYPERPOTASSEMIA: Primary | ICD-10-CM

## 2022-05-16 LAB
ANION GAP SERPL CALC-SCNC: 7 MMOL/L (ref 8–16)
BUN SERPL-MCNC: 21 MG/DL (ref 8–23)
CALCIUM SERPL-MCNC: 7.8 MG/DL (ref 8.7–10.5)
CHLORIDE SERPL-SCNC: 111 MMOL/L (ref 95–110)
CO2 SERPL-SCNC: 22 MMOL/L (ref 23–29)
CREAT SERPL-MCNC: 1.4 MG/DL (ref 0.5–1.4)
EST. GFR  (AFRICAN AMERICAN): 45.2 ML/MIN/1.73 M^2
EST. GFR  (NON AFRICAN AMERICAN): 39.2 ML/MIN/1.73 M^2
GLUCOSE SERPL-MCNC: 116 MG/DL (ref 70–110)
POTASSIUM SERPL-SCNC: 2.9 MMOL/L (ref 3.5–5.1)
SODIUM SERPL-SCNC: 140 MMOL/L (ref 136–145)

## 2022-05-16 PROCEDURE — 80048 BASIC METABOLIC PNL TOTAL CA: CPT | Performed by: INTERNAL MEDICINE

## 2022-05-16 PROCEDURE — 36415 COLL VENOUS BLD VENIPUNCTURE: CPT | Performed by: INTERNAL MEDICINE

## 2022-05-17 ENCOUNTER — LAB VISIT (OUTPATIENT)
Dept: LAB | Facility: HOSPITAL | Age: 67
End: 2022-05-17
Attending: INTERNAL MEDICINE
Payer: MEDICARE

## 2022-05-17 DIAGNOSIS — R19.7 DIARRHEA: Primary | ICD-10-CM

## 2022-05-17 PROCEDURE — 87493 C DIFF AMPLIFIED PROBE: CPT | Performed by: INTERNAL MEDICINE

## 2022-05-17 PROCEDURE — 87449 NOS EACH ORGANISM AG IA: CPT | Performed by: INTERNAL MEDICINE

## 2022-05-18 LAB
C DIFF GDH STL QL: POSITIVE
C DIFF TOX A+B STL QL IA: NEGATIVE
C DIFF TOX GENS STL QL NAA+PROBE: NEGATIVE

## 2022-05-23 ENCOUNTER — LAB VISIT (OUTPATIENT)
Dept: LAB | Facility: HOSPITAL | Age: 67
End: 2022-05-23
Attending: NURSE PRACTITIONER
Payer: MEDICARE

## 2022-05-23 DIAGNOSIS — N18.9 CHRONIC KIDNEY DISEASE, UNSPECIFIED CKD STAGE: ICD-10-CM

## 2022-05-23 DIAGNOSIS — E87.6 HYPOKALEMIA: ICD-10-CM

## 2022-05-23 LAB
ALBUMIN SERPL BCP-MCNC: 3.4 G/DL (ref 3.5–5.2)
ALP SERPL-CCNC: 41 U/L (ref 55–135)
ALT SERPL W/O P-5'-P-CCNC: 18 U/L (ref 10–44)
ANION GAP SERPL CALC-SCNC: 4 MMOL/L (ref 8–16)
AST SERPL-CCNC: 19 U/L (ref 10–40)
BASOPHILS # BLD AUTO: 0.04 K/UL (ref 0–0.2)
BASOPHILS NFR BLD: 0.5 % (ref 0–1.9)
BILIRUB SERPL-MCNC: 0.2 MG/DL (ref 0.1–1)
BUN SERPL-MCNC: 14 MG/DL (ref 8–23)
CALCIUM SERPL-MCNC: 7.9 MG/DL (ref 8.7–10.5)
CHLORIDE SERPL-SCNC: 106 MMOL/L (ref 95–110)
CO2 SERPL-SCNC: 29 MMOL/L (ref 23–29)
CREAT SERPL-MCNC: 1.4 MG/DL (ref 0.5–1.4)
DIFFERENTIAL METHOD: ABNORMAL
EOSINOPHIL # BLD AUTO: 0.1 K/UL (ref 0–0.5)
EOSINOPHIL NFR BLD: 0.8 % (ref 0–8)
ERYTHROCYTE [DISTWIDTH] IN BLOOD BY AUTOMATED COUNT: 13.1 % (ref 11.5–14.5)
EST. GFR  (AFRICAN AMERICAN): 45.2 ML/MIN/1.73 M^2
EST. GFR  (NON AFRICAN AMERICAN): 39.2 ML/MIN/1.73 M^2
GLUCOSE SERPL-MCNC: 111 MG/DL (ref 70–110)
HCT VFR BLD AUTO: 36.6 % (ref 37–48.5)
HGB BLD-MCNC: 12.7 G/DL (ref 12–16)
IMM GRANULOCYTES # BLD AUTO: 0.08 K/UL (ref 0–0.04)
IMM GRANULOCYTES NFR BLD AUTO: 1 % (ref 0–0.5)
LYMPHOCYTES # BLD AUTO: 3.3 K/UL (ref 1–4.8)
LYMPHOCYTES NFR BLD: 39.7 % (ref 18–48)
MCH RBC QN AUTO: 31.8 PG (ref 27–31)
MCHC RBC AUTO-ENTMCNC: 34.7 G/DL (ref 32–36)
MCV RBC AUTO: 92 FL (ref 82–98)
MONOCYTES # BLD AUTO: 1.1 K/UL (ref 0.3–1)
MONOCYTES NFR BLD: 13.8 % (ref 4–15)
NEUTROPHILS # BLD AUTO: 3.7 K/UL (ref 1.8–7.7)
NEUTROPHILS NFR BLD: 44.2 % (ref 38–73)
NRBC BLD-RTO: 0 /100 WBC
PLATELET # BLD AUTO: 294 K/UL (ref 150–450)
PMV BLD AUTO: 10.3 FL (ref 9.2–12.9)
POTASSIUM SERPL-SCNC: 3.4 MMOL/L (ref 3.5–5.1)
PROT SERPL-MCNC: 7.3 G/DL (ref 6–8.4)
RBC # BLD AUTO: 3.99 M/UL (ref 4–5.4)
SODIUM SERPL-SCNC: 139 MMOL/L (ref 136–145)
WBC # BLD AUTO: 8.27 K/UL (ref 3.9–12.7)

## 2022-05-23 PROCEDURE — 36415 COLL VENOUS BLD VENIPUNCTURE: CPT | Performed by: NURSE PRACTITIONER

## 2022-05-23 PROCEDURE — 85025 COMPLETE CBC W/AUTO DIFF WBC: CPT | Performed by: NURSE PRACTITIONER

## 2022-05-23 PROCEDURE — 80053 COMPREHEN METABOLIC PANEL: CPT | Performed by: NURSE PRACTITIONER

## 2022-06-02 ENCOUNTER — LAB VISIT (OUTPATIENT)
Dept: LAB | Facility: HOSPITAL | Age: 67
End: 2022-06-02
Attending: NURSE PRACTITIONER
Payer: MEDICARE

## 2022-06-02 DIAGNOSIS — E87.6 HYPOKALEMIA: ICD-10-CM

## 2022-06-02 DIAGNOSIS — N28.9 KIDNEY DISEASE: ICD-10-CM

## 2022-06-02 DIAGNOSIS — R73.01 IMPAIRED FASTING GLUCOSE: ICD-10-CM

## 2022-06-02 LAB
ALBUMIN SERPL BCP-MCNC: 3.8 G/DL (ref 3.5–5.2)
ALP SERPL-CCNC: 46 U/L (ref 55–135)
ALT SERPL W/O P-5'-P-CCNC: 24 U/L (ref 10–44)
ANION GAP SERPL CALC-SCNC: 8 MMOL/L (ref 8–16)
AST SERPL-CCNC: 16 U/L (ref 10–40)
BASOPHILS # BLD AUTO: 0.05 K/UL (ref 0–0.2)
BASOPHILS NFR BLD: 0.7 % (ref 0–1.9)
BILIRUB SERPL-MCNC: 0.3 MG/DL (ref 0.1–1)
BUN SERPL-MCNC: 27 MG/DL (ref 8–23)
CALCIUM SERPL-MCNC: 8.6 MG/DL (ref 8.7–10.5)
CHLORIDE SERPL-SCNC: 108 MMOL/L (ref 95–110)
CO2 SERPL-SCNC: 23 MMOL/L (ref 23–29)
CREAT SERPL-MCNC: 1.4 MG/DL (ref 0.5–1.4)
DIFFERENTIAL METHOD: ABNORMAL
EOSINOPHIL # BLD AUTO: 0.1 K/UL (ref 0–0.5)
EOSINOPHIL NFR BLD: 1.1 % (ref 0–8)
ERYTHROCYTE [DISTWIDTH] IN BLOOD BY AUTOMATED COUNT: 12.9 % (ref 11.5–14.5)
EST. GFR  (AFRICAN AMERICAN): 45.2 ML/MIN/1.73 M^2
EST. GFR  (NON AFRICAN AMERICAN): 39.2 ML/MIN/1.73 M^2
ESTIMATED AVG GLUCOSE: 131 MG/DL (ref 68–131)
GLUCOSE SERPL-MCNC: 101 MG/DL (ref 70–110)
HBA1C MFR BLD: 6.2 % (ref 4–5.6)
HCT VFR BLD AUTO: 38.8 % (ref 37–48.5)
HGB BLD-MCNC: 12.8 G/DL (ref 12–16)
IMM GRANULOCYTES # BLD AUTO: 0.05 K/UL (ref 0–0.04)
IMM GRANULOCYTES NFR BLD AUTO: 0.7 % (ref 0–0.5)
LYMPHOCYTES # BLD AUTO: 2.9 K/UL (ref 1–4.8)
LYMPHOCYTES NFR BLD: 38.4 % (ref 18–48)
MCH RBC QN AUTO: 31.6 PG (ref 27–31)
MCHC RBC AUTO-ENTMCNC: 33 G/DL (ref 32–36)
MCV RBC AUTO: 96 FL (ref 82–98)
MONOCYTES # BLD AUTO: 0.9 K/UL (ref 0.3–1)
MONOCYTES NFR BLD: 12.4 % (ref 4–15)
NEUTROPHILS # BLD AUTO: 3.5 K/UL (ref 1.8–7.7)
NEUTROPHILS NFR BLD: 46.7 % (ref 38–73)
NRBC BLD-RTO: 0 /100 WBC
PLATELET # BLD AUTO: 291 K/UL (ref 150–450)
PMV BLD AUTO: 10 FL (ref 9.2–12.9)
POTASSIUM SERPL-SCNC: 4.1 MMOL/L (ref 3.5–5.1)
PROT SERPL-MCNC: 7.6 G/DL (ref 6–8.4)
RBC # BLD AUTO: 4.05 M/UL (ref 4–5.4)
SODIUM SERPL-SCNC: 139 MMOL/L (ref 136–145)
WBC # BLD AUTO: 7.44 K/UL (ref 3.9–12.7)

## 2022-06-02 PROCEDURE — 80053 COMPREHEN METABOLIC PANEL: CPT | Performed by: NURSE PRACTITIONER

## 2022-06-02 PROCEDURE — 36415 COLL VENOUS BLD VENIPUNCTURE: CPT | Performed by: NURSE PRACTITIONER

## 2022-06-02 PROCEDURE — 85025 COMPLETE CBC W/AUTO DIFF WBC: CPT | Performed by: NURSE PRACTITIONER

## 2022-06-02 PROCEDURE — 83036 HEMOGLOBIN GLYCOSYLATED A1C: CPT | Performed by: NURSE PRACTITIONER

## 2022-06-07 ENCOUNTER — LAB VISIT (OUTPATIENT)
Dept: LAB | Facility: HOSPITAL | Age: 67
End: 2022-06-07
Attending: NURSE PRACTITIONER
Payer: MEDICARE

## 2022-06-07 DIAGNOSIS — R19.7 DIARRHEA OF PRESUMED INFECTIOUS ORIGIN: Primary | ICD-10-CM

## 2022-06-07 LAB — OB PNL STL: POSITIVE

## 2022-06-07 PROCEDURE — 87045 FECES CULTURE AEROBIC BACT: CPT | Performed by: NURSE PRACTITIONER

## 2022-06-07 PROCEDURE — 87427 SHIGA-LIKE TOXIN AG IA: CPT | Mod: 59 | Performed by: NURSE PRACTITIONER

## 2022-06-07 PROCEDURE — 87046 STOOL CULTR AEROBIC BACT EA: CPT | Performed by: NURSE PRACTITIONER

## 2022-06-07 PROCEDURE — 82272 OCCULT BLD FECES 1-3 TESTS: CPT | Performed by: NURSE PRACTITIONER

## 2022-06-09 LAB
E COLI SXT1 STL QL IA: NEGATIVE
E COLI SXT2 STL QL IA: NEGATIVE

## 2022-06-11 LAB — BACTERIA STL CULT: NORMAL

## 2022-06-13 LAB — O+P STL MICRO: NORMAL

## 2022-06-16 ENCOUNTER — LAB VISIT (OUTPATIENT)
Dept: LAB | Facility: HOSPITAL | Age: 67
End: 2022-06-16
Attending: NURSE PRACTITIONER
Payer: MEDICARE

## 2022-06-16 DIAGNOSIS — E87.6 HYPOKALEMIA: ICD-10-CM

## 2022-06-16 DIAGNOSIS — R53.83 FATIGUE, UNSPECIFIED TYPE: ICD-10-CM

## 2022-06-16 LAB
ALBUMIN SERPL BCP-MCNC: 4.1 G/DL (ref 3.5–5.2)
ALP SERPL-CCNC: 39 U/L (ref 55–135)
ALT SERPL W/O P-5'-P-CCNC: 22 U/L (ref 10–44)
ANION GAP SERPL CALC-SCNC: 6 MMOL/L (ref 8–16)
AST SERPL-CCNC: 16 U/L (ref 10–40)
BASOPHILS # BLD AUTO: 0.06 K/UL (ref 0–0.2)
BASOPHILS NFR BLD: 0.6 % (ref 0–1.9)
BILIRUB SERPL-MCNC: 0.3 MG/DL (ref 0.1–1)
BUN SERPL-MCNC: 20 MG/DL (ref 8–23)
CALCIUM SERPL-MCNC: 8.6 MG/DL (ref 8.7–10.5)
CHLORIDE SERPL-SCNC: 110 MMOL/L (ref 95–110)
CO2 SERPL-SCNC: 24 MMOL/L (ref 23–29)
CREAT SERPL-MCNC: 1.5 MG/DL (ref 0.5–1.4)
DIFFERENTIAL METHOD: ABNORMAL
EOSINOPHIL # BLD AUTO: 0.2 K/UL (ref 0–0.5)
EOSINOPHIL NFR BLD: 1.6 % (ref 0–8)
ERYTHROCYTE [DISTWIDTH] IN BLOOD BY AUTOMATED COUNT: 13.1 % (ref 11.5–14.5)
EST. GFR  (AFRICAN AMERICAN): 41.6 ML/MIN/1.73 M^2
EST. GFR  (NON AFRICAN AMERICAN): 36 ML/MIN/1.73 M^2
GLUCOSE SERPL-MCNC: 125 MG/DL (ref 70–110)
HCT VFR BLD AUTO: 39.4 % (ref 37–48.5)
HGB BLD-MCNC: 13 G/DL (ref 12–16)
IMM GRANULOCYTES # BLD AUTO: 0.07 K/UL (ref 0–0.04)
IMM GRANULOCYTES NFR BLD AUTO: 0.7 % (ref 0–0.5)
LYMPHOCYTES # BLD AUTO: 3.8 K/UL (ref 1–4.8)
LYMPHOCYTES NFR BLD: 38.1 % (ref 18–48)
MCH RBC QN AUTO: 31.6 PG (ref 27–31)
MCHC RBC AUTO-ENTMCNC: 33 G/DL (ref 32–36)
MCV RBC AUTO: 96 FL (ref 82–98)
MONOCYTES # BLD AUTO: 0.9 K/UL (ref 0.3–1)
MONOCYTES NFR BLD: 9.5 % (ref 4–15)
NEUTROPHILS # BLD AUTO: 4.9 K/UL (ref 1.8–7.7)
NEUTROPHILS NFR BLD: 49.5 % (ref 38–73)
NRBC BLD-RTO: 0 /100 WBC
PLATELET # BLD AUTO: 279 K/UL (ref 150–450)
PMV BLD AUTO: 10.4 FL (ref 9.2–12.9)
POTASSIUM SERPL-SCNC: 3.8 MMOL/L (ref 3.5–5.1)
PROT SERPL-MCNC: 7.7 G/DL (ref 6–8.4)
RBC # BLD AUTO: 4.12 M/UL (ref 4–5.4)
SODIUM SERPL-SCNC: 140 MMOL/L (ref 136–145)
WBC # BLD AUTO: 9.91 K/UL (ref 3.9–12.7)

## 2022-06-16 PROCEDURE — 80053 COMPREHEN METABOLIC PANEL: CPT | Performed by: NURSE PRACTITIONER

## 2022-06-16 PROCEDURE — 36415 COLL VENOUS BLD VENIPUNCTURE: CPT | Performed by: NURSE PRACTITIONER

## 2022-06-16 PROCEDURE — 85025 COMPLETE CBC W/AUTO DIFF WBC: CPT | Performed by: NURSE PRACTITIONER

## 2022-10-14 ENCOUNTER — LAB VISIT (OUTPATIENT)
Dept: LAB | Facility: HOSPITAL | Age: 67
End: 2022-10-14
Attending: INTERNAL MEDICINE
Payer: MEDICARE

## 2022-10-14 DIAGNOSIS — M79.7 SCAPULOHUMERAL FIBROSITIS: ICD-10-CM

## 2022-10-14 DIAGNOSIS — M81.0 SENILE OSTEOPOROSIS: ICD-10-CM

## 2022-10-14 DIAGNOSIS — L40.50 PSORIATIC ARTHROPATHY: Primary | ICD-10-CM

## 2022-10-14 DIAGNOSIS — N18.30 CHRONIC KIDNEY DISEASE, STAGE III (MODERATE): ICD-10-CM

## 2022-10-14 LAB
ALBUMIN SERPL BCP-MCNC: 3.7 G/DL (ref 3.5–5.2)
ALP SERPL-CCNC: 43 U/L (ref 55–135)
ALT SERPL W/O P-5'-P-CCNC: 16 U/L (ref 10–44)
ANION GAP SERPL CALC-SCNC: 6 MMOL/L (ref 8–16)
AST SERPL-CCNC: 12 U/L (ref 10–40)
BILIRUB SERPL-MCNC: 0.2 MG/DL (ref 0.1–1)
BUN SERPL-MCNC: 17 MG/DL (ref 8–23)
CALCIUM SERPL-MCNC: 8.1 MG/DL (ref 8.7–10.5)
CHLORIDE SERPL-SCNC: 108 MMOL/L (ref 95–110)
CO2 SERPL-SCNC: 26 MMOL/L (ref 23–29)
CREAT SERPL-MCNC: 1.1 MG/DL (ref 0.5–1.4)
EST. GFR  (NO RACE VARIABLE): 55.1 ML/MIN/1.73 M^2
GLUCOSE SERPL-MCNC: 114 MG/DL (ref 70–110)
PHOSPHATE SERPL-MCNC: 1.8 MG/DL (ref 2.7–4.5)
POTASSIUM SERPL-SCNC: 3.8 MMOL/L (ref 3.5–5.1)
PROT SERPL-MCNC: 7.3 G/DL (ref 6–8.4)
SODIUM SERPL-SCNC: 140 MMOL/L (ref 136–145)

## 2022-10-14 PROCEDURE — 36415 COLL VENOUS BLD VENIPUNCTURE: CPT | Performed by: INTERNAL MEDICINE

## 2022-10-14 PROCEDURE — 80053 COMPREHEN METABOLIC PANEL: CPT | Performed by: INTERNAL MEDICINE

## 2022-10-14 PROCEDURE — 84100 ASSAY OF PHOSPHORUS: CPT | Performed by: INTERNAL MEDICINE

## 2022-11-16 ENCOUNTER — HOSPITAL ENCOUNTER (OUTPATIENT)
Dept: RADIOLOGY | Facility: HOSPITAL | Age: 67
Discharge: HOME | End: 2022-11-16
Attending: NURSE PRACTITIONER
Payer: MEDICARE

## 2022-11-16 VITALS — WEIGHT: 105 LBS | BODY MASS INDEX: 19.83 KG/M2 | HEIGHT: 61 IN

## 2022-11-16 DIAGNOSIS — Z12.31 SCREENING MAMMOGRAM FOR BREAST CANCER: ICD-10-CM

## 2022-11-16 PROCEDURE — 77063 BREAST TOMOSYNTHESIS BI: CPT | Mod: TC

## 2023-01-25 DIAGNOSIS — M81.0 OSTEOPOROSIS: Primary | ICD-10-CM

## 2023-01-26 ENCOUNTER — HOSPITAL ENCOUNTER (OUTPATIENT)
Dept: RADIOLOGY | Facility: HOSPITAL | Age: 68
Discharge: HOME | End: 2023-01-26
Attending: INTERNAL MEDICINE
Payer: MEDICARE

## 2023-01-26 DIAGNOSIS — M81.0 OSTEOPOROSIS: ICD-10-CM

## 2023-01-26 PROCEDURE — 77080 DXA BONE DENSITY AXIAL: CPT | Mod: TC

## 2023-09-01 ENCOUNTER — HOSPITAL ENCOUNTER (OUTPATIENT)
Dept: RADIOLOGY | Facility: HOSPITAL | Age: 68
Discharge: HOME | End: 2023-09-01
Attending: FAMILY MEDICINE
Payer: MEDICARE

## 2023-09-01 DIAGNOSIS — M54.59 OTHER LOW BACK PAIN: ICD-10-CM

## 2023-09-01 DIAGNOSIS — M43.16 SPONDYLOLISTHESIS, LUMBAR REGION: ICD-10-CM

## 2023-09-01 DIAGNOSIS — M46.1 SACROILIITIS, NOT ELSEWHERE CLASSIFIED: ICD-10-CM

## 2023-09-01 PROCEDURE — 72148 MRI LUMBAR SPINE W/O DYE: CPT | Mod: TC

## 2023-09-21 ENCOUNTER — HOSPITAL ENCOUNTER (OUTPATIENT)
Dept: RADIOLOGY | Facility: HOSPITAL | Age: 68
Discharge: HOME | End: 2023-09-21
Attending: FAMILY MEDICINE
Payer: MEDICARE

## 2023-09-21 DIAGNOSIS — M47.819 SPONDYLOSIS WITHOUT MYELOPATHY OR RADICULOPATHY, SITE UNSPECIFIED: ICD-10-CM

## 2023-09-21 DIAGNOSIS — M54.6 PAIN IN THORACIC SPINE: ICD-10-CM

## 2023-09-21 PROCEDURE — 72146 MRI CHEST SPINE W/O DYE: CPT | Mod: TC

## 2023-10-16 ENCOUNTER — LAB VISIT (OUTPATIENT)
Dept: LAB | Facility: HOSPITAL | Age: 68
End: 2023-10-16
Attending: INTERNAL MEDICINE
Payer: MEDICARE

## 2023-10-16 DIAGNOSIS — N18.31 CHRONIC KIDNEY DISEASE (CKD) STAGE G3A/A1, MODERATELY DECREASED GLOMERULAR FILTRATION RATE (GFR) BETWEEN 45-59 ML/MIN/1.73 SQUARE METER AND ALBUMINURIA CREATININE RATIO LESS THAN 30 MG/G: Primary | ICD-10-CM

## 2023-10-16 LAB
ALBUMIN SERPL BCP-MCNC: 4 G/DL (ref 3.5–5.2)
ALP SERPL-CCNC: 36 U/L (ref 55–135)
ALT SERPL W/O P-5'-P-CCNC: 20 U/L (ref 10–44)
ANION GAP SERPL CALC-SCNC: 5 MMOL/L (ref 3–11)
AST SERPL-CCNC: 12 U/L (ref 10–40)
BILIRUB SERPL-MCNC: 0.2 MG/DL (ref 0.1–1)
BUN SERPL-MCNC: 21 MG/DL (ref 8–23)
CALCIUM SERPL-MCNC: 9.2 MG/DL (ref 8.7–10.5)
CHLORIDE SERPL-SCNC: 108 MMOL/L (ref 95–110)
CO2 SERPL-SCNC: 27 MMOL/L (ref 23–29)
CREAT SERPL-MCNC: 1.5 MG/DL (ref 0.5–1.4)
EST. GFR  (NO RACE VARIABLE): 37.7 ML/MIN/1.73 M^2
GLUCOSE SERPL-MCNC: 105 MG/DL (ref 70–110)
PHOSPHATE SERPL-MCNC: 3.4 MG/DL (ref 2.7–4.5)
POTASSIUM SERPL-SCNC: 3.4 MMOL/L (ref 3.5–5.1)
PROT SERPL-MCNC: 7.4 G/DL (ref 6–8.4)
SODIUM SERPL-SCNC: 140 MMOL/L (ref 136–145)

## 2023-10-16 PROCEDURE — 84100 ASSAY OF PHOSPHORUS: CPT | Performed by: INTERNAL MEDICINE

## 2023-10-16 PROCEDURE — 80053 COMPREHEN METABOLIC PANEL: CPT | Performed by: INTERNAL MEDICINE

## 2023-10-16 PROCEDURE — 36415 COLL VENOUS BLD VENIPUNCTURE: CPT | Performed by: INTERNAL MEDICINE

## 2023-11-20 ENCOUNTER — HOSPITAL ENCOUNTER (OUTPATIENT)
Dept: RADIOLOGY | Facility: HOSPITAL | Age: 68
Discharge: HOME | End: 2023-11-20
Attending: FAMILY MEDICINE
Payer: MEDICARE

## 2023-11-20 VITALS — WEIGHT: 102 LBS | BODY MASS INDEX: 19.26 KG/M2 | HEIGHT: 61 IN

## 2023-11-20 DIAGNOSIS — Z12.31 BREAST CANCER SCREENING BY MAMMOGRAM: ICD-10-CM

## 2023-11-20 PROCEDURE — 77067 SCR MAMMO BI INCL CAD: CPT | Mod: TC

## 2023-12-06 ENCOUNTER — LAB VISIT (OUTPATIENT)
Dept: LAB | Facility: HOSPITAL | Age: 68
End: 2023-12-06
Attending: INTERNAL MEDICINE
Payer: MEDICARE

## 2023-12-06 DIAGNOSIS — I10 ESSENTIAL HYPERTENSION, MALIGNANT: ICD-10-CM

## 2023-12-06 DIAGNOSIS — R06.09 DYSPNEA ON EXERTION: Primary | ICD-10-CM

## 2023-12-06 LAB
ALBUMIN SERPL BCP-MCNC: 3.7 G/DL (ref 3.5–5.2)
ALP SERPL-CCNC: 36 U/L (ref 55–135)
ALT SERPL W/O P-5'-P-CCNC: 17 U/L (ref 10–44)
ANION GAP SERPL CALC-SCNC: 6 MMOL/L (ref 3–11)
AST SERPL-CCNC: 13 U/L (ref 10–40)
BILIRUB SERPL-MCNC: 0.1 MG/DL (ref 0.1–1)
BUN SERPL-MCNC: 26 MG/DL (ref 8–23)
CALCIUM SERPL-MCNC: 8.9 MG/DL (ref 8.7–10.5)
CHLORIDE SERPL-SCNC: 107 MMOL/L (ref 95–110)
CHOLEST SERPL-MCNC: 201 MG/DL (ref 120–199)
CHOLEST/HDLC SERPL: 3.7 {RATIO} (ref 2–5)
CO2 SERPL-SCNC: 25 MMOL/L (ref 23–29)
CREAT SERPL-MCNC: 1.4 MG/DL (ref 0.5–1.4)
EST. GFR  (NO RACE VARIABLE): 41 ML/MIN/1.73 M^2
GLUCOSE SERPL-MCNC: 111 MG/DL (ref 70–110)
HDLC SERPL-MCNC: 54 MG/DL (ref 40–75)
HDLC SERPL: 26.9 % (ref 20–50)
LDLC SERPL CALC-MCNC: 117.2 MG/DL (ref 63–159)
NONHDLC SERPL-MCNC: 147 MG/DL
POTASSIUM SERPL-SCNC: 4.3 MMOL/L (ref 3.5–5.1)
PROT SERPL-MCNC: 7 G/DL (ref 6–8.4)
SODIUM SERPL-SCNC: 138 MMOL/L (ref 136–145)
TRIGL SERPL-MCNC: 149 MG/DL (ref 30–150)

## 2023-12-06 PROCEDURE — 80053 COMPREHEN METABOLIC PANEL: CPT | Performed by: INTERNAL MEDICINE

## 2023-12-06 PROCEDURE — 36415 COLL VENOUS BLD VENIPUNCTURE: CPT | Performed by: INTERNAL MEDICINE

## 2023-12-06 PROCEDURE — 80061 LIPID PANEL: CPT | Performed by: INTERNAL MEDICINE

## 2024-02-22 ENCOUNTER — LAB VISIT (OUTPATIENT)
Dept: LAB | Facility: HOSPITAL | Age: 69
End: 2024-02-22
Attending: INTERNAL MEDICINE
Payer: MEDICARE

## 2024-02-22 DIAGNOSIS — Z79.899 ENCOUNTER FOR LONG-TERM (CURRENT) USE OF OTHER MEDICATIONS: Primary | ICD-10-CM

## 2024-02-22 LAB
ALBUMIN SERPL BCP-MCNC: 4 G/DL (ref 3.5–5.2)
ALP SERPL-CCNC: 28 U/L (ref 55–135)
ALT SERPL W/O P-5'-P-CCNC: 17 U/L (ref 10–44)
AST SERPL-CCNC: 7 U/L (ref 10–40)
BILIRUB DIRECT SERPL-MCNC: <0.1 MG/DL (ref 0.1–0.3)
BILIRUB SERPL-MCNC: 0.2 MG/DL (ref 0.1–1)
PROT SERPL-MCNC: 7.7 G/DL (ref 6–8.4)

## 2024-02-22 PROCEDURE — 36415 COLL VENOUS BLD VENIPUNCTURE: CPT | Performed by: INTERNAL MEDICINE

## 2024-02-22 PROCEDURE — 80076 HEPATIC FUNCTION PANEL: CPT | Performed by: INTERNAL MEDICINE

## 2024-03-15 ENCOUNTER — HOSPITAL ENCOUNTER (OUTPATIENT)
Dept: RADIOLOGY | Facility: HOSPITAL | Age: 69
Discharge: HOME | End: 2024-03-15
Attending: NURSE PRACTITIONER
Payer: MEDICARE

## 2024-03-15 DIAGNOSIS — G44.52 NEW DAILY PERSISTENT HEADACHE: ICD-10-CM

## 2024-03-15 PROCEDURE — 70551 MRI BRAIN STEM W/O DYE: CPT | Mod: TC

## 2024-04-18 ENCOUNTER — LAB VISIT (OUTPATIENT)
Dept: LAB | Facility: HOSPITAL | Age: 69
End: 2024-04-18
Attending: INTERNAL MEDICINE
Payer: MEDICARE

## 2024-04-18 DIAGNOSIS — N18.32 CHRONIC KIDNEY DISEASE (CKD) STAGE G3B/A1, MODERATELY DECREASED GLOMERULAR FILTRATION RATE (GFR) BETWEEN 30-44 ML/MIN/1.73 SQUARE METER AND ALBUMINURIA CREATININE RATIO LESS THAN 30 MG/G: Primary | ICD-10-CM

## 2024-04-18 DIAGNOSIS — N18.32 STAGE 3B CHRONIC KIDNEY DISEASE: Primary | ICD-10-CM

## 2024-04-18 LAB
ALBUMIN SERPL BCP-MCNC: 3.8 G/DL (ref 3.5–5.2)
ALBUMIN/CREAT UR: 12.2 UG/MG (ref 0–30)
ALP SERPL-CCNC: 34 U/L (ref 55–135)
ALT SERPL W/O P-5'-P-CCNC: 14 U/L (ref 10–44)
ANION GAP SERPL CALC-SCNC: 8 MMOL/L (ref 3–11)
AST SERPL-CCNC: 6 U/L (ref 10–40)
BACTERIA #/AREA URNS HPF: ABNORMAL /HPF
BASOPHILS # BLD AUTO: 0.04 K/UL (ref 0–0.2)
BASOPHILS NFR BLD: 0.5 % (ref 0–1.9)
BILIRUB SERPL-MCNC: 0.2 MG/DL (ref 0.1–1)
BILIRUB UR QL STRIP: NEGATIVE
BUN SERPL-MCNC: 18 MG/DL (ref 8–23)
CALCIUM SERPL-MCNC: 9.5 MG/DL (ref 8.7–10.5)
CALCIUM SERPL-MCNC: 9.5 MG/DL (ref 8.7–10.5)
CHLORIDE SERPL-SCNC: 107 MMOL/L (ref 95–110)
CLARITY UR: ABNORMAL
CO2 SERPL-SCNC: 23 MMOL/L (ref 23–29)
COLOR UR: YELLOW
CREAT SERPL-MCNC: 1.3 MG/DL (ref 0.5–1.4)
CREAT UR-MCNC: 175 MG/DL (ref 15–325)
CREAT UR-MCNC: 175 MG/DL (ref 15–325)
DIFFERENTIAL METHOD BLD: ABNORMAL
EOSINOPHIL # BLD AUTO: 0.4 K/UL (ref 0–0.5)
EOSINOPHIL NFR BLD: 5.4 % (ref 0–8)
ERYTHROCYTE [DISTWIDTH] IN BLOOD BY AUTOMATED COUNT: 12.8 % (ref 11.5–14.5)
EST. GFR  (NO RACE VARIABLE): 44.8 ML/MIN/1.73 M^2
GLUCOSE SERPL-MCNC: 117 MG/DL (ref 70–110)
GLUCOSE UR QL STRIP: NEGATIVE
HCT VFR BLD AUTO: 35.7 % (ref 37–48.5)
HGB BLD-MCNC: 12.1 G/DL (ref 12–16)
HGB UR QL STRIP: NEGATIVE
HYALINE CASTS #/AREA URNS LPF: 0 /LPF
IMM GRANULOCYTES # BLD AUTO: 0.02 K/UL (ref 0–0.04)
IMM GRANULOCYTES NFR BLD AUTO: 0.3 % (ref 0–0.5)
KETONES UR QL STRIP: ABNORMAL
LEUKOCYTE ESTERASE UR QL STRIP: ABNORMAL
LYMPHOCYTES # BLD AUTO: 3.5 K/UL (ref 1–4.8)
LYMPHOCYTES NFR BLD: 46.6 % (ref 18–48)
MAGNESIUM SERPL-MCNC: 1.7 MG/DL (ref 1.6–2.6)
MCH RBC QN AUTO: 32.5 PG (ref 27–31)
MCHC RBC AUTO-ENTMCNC: 33.9 G/DL (ref 32–36)
MCV RBC AUTO: 96 FL (ref 82–98)
MICROALBUMIN UR DL<=1MG/L-MCNC: 21.4 MG/L
MICROSCOPIC COMMENT: ABNORMAL
MONOCYTES # BLD AUTO: 0.7 K/UL (ref 0.3–1)
MONOCYTES NFR BLD: 9 % (ref 4–15)
NEUTROPHILS # BLD AUTO: 2.9 K/UL (ref 1.8–7.7)
NEUTROPHILS NFR BLD: 38.2 % (ref 38–73)
NITRITE UR QL STRIP: NEGATIVE
NRBC BLD-RTO: 0 /100 WBC
PH UR STRIP: 6 [PH] (ref 5–8)
PHOSPHATE SERPL-MCNC: 3.6 MG/DL (ref 2.7–4.5)
PLATELET # BLD AUTO: 240 K/UL (ref 150–450)
PMV BLD AUTO: 9.9 FL (ref 9.2–12.9)
POTASSIUM SERPL-SCNC: 4 MMOL/L (ref 3.5–5.1)
PROT SERPL-MCNC: 7.2 G/DL (ref 6–8.4)
PROT UR QL STRIP: ABNORMAL
PROT UR-MCNC: 31.8 MG/DL (ref 0–15)
PROT/CREAT UR: 0.18 MG/G{CREAT} (ref 0–0.2)
PTH-INTACT SERPL-MCNC: 38 PG/ML (ref 9–77)
RBC # BLD AUTO: 3.72 M/UL (ref 4–5.4)
RBC #/AREA URNS HPF: 4 /HPF (ref 0–4)
SODIUM SERPL-SCNC: 138 MMOL/L (ref 136–145)
SP GR UR STRIP: 1.02 (ref 1–1.03)
SQUAMOUS #/AREA URNS HPF: >36 /HPF
URN SPEC COLLECT METH UR: ABNORMAL
UROBILINOGEN UR STRIP-ACNC: NEGATIVE EU/DL
WBC # BLD AUTO: 7.55 K/UL (ref 3.9–12.7)
WBC #/AREA URNS HPF: 58 /HPF (ref 0–5)

## 2024-04-18 PROCEDURE — 85025 COMPLETE CBC W/AUTO DIFF WBC: CPT | Performed by: INTERNAL MEDICINE

## 2024-04-18 PROCEDURE — 80053 COMPREHEN METABOLIC PANEL: CPT | Performed by: INTERNAL MEDICINE

## 2024-04-18 PROCEDURE — 81000 URINALYSIS NONAUTO W/SCOPE: CPT | Performed by: INTERNAL MEDICINE

## 2024-04-18 PROCEDURE — 36415 COLL VENOUS BLD VENIPUNCTURE: CPT | Performed by: INTERNAL MEDICINE

## 2024-04-18 PROCEDURE — 86334 IMMUNOFIX E-PHORESIS SERUM: CPT | Mod: 26,,, | Performed by: PATHOLOGY

## 2024-04-18 PROCEDURE — 86335 IMMUNFIX E-PHORSIS/URINE/CSF: CPT | Mod: 26,,, | Performed by: PATHOLOGY

## 2024-04-18 PROCEDURE — 83735 ASSAY OF MAGNESIUM: CPT | Performed by: INTERNAL MEDICINE

## 2024-04-18 PROCEDURE — 86335 IMMUNFIX E-PHORSIS/URINE/CSF: CPT | Performed by: INTERNAL MEDICINE

## 2024-04-18 PROCEDURE — 86334 IMMUNOFIX E-PHORESIS SERUM: CPT | Performed by: INTERNAL MEDICINE

## 2024-04-18 PROCEDURE — 84156 ASSAY OF PROTEIN URINE: CPT | Performed by: INTERNAL MEDICINE

## 2024-04-18 PROCEDURE — 82043 UR ALBUMIN QUANTITATIVE: CPT | Performed by: INTERNAL MEDICINE

## 2024-04-18 PROCEDURE — 83970 ASSAY OF PARATHORMONE: CPT | Performed by: INTERNAL MEDICINE

## 2024-04-18 PROCEDURE — 83521 IG LIGHT CHAINS FREE EACH: CPT | Mod: 59 | Performed by: INTERNAL MEDICINE

## 2024-04-18 PROCEDURE — 84100 ASSAY OF PHOSPHORUS: CPT | Performed by: INTERNAL MEDICINE

## 2024-04-19 LAB
INTERPRETATION SERPL IFE-IMP: NORMAL
KAPPA LC SER QL IA: 2.46 MG/DL (ref 0.33–1.94)
KAPPA LC/LAMBDA SER IA: 1.37 (ref 0.26–1.65)
LAMBDA LC SER QL IA: 1.79 MG/DL (ref 0.57–2.63)

## 2024-04-22 ENCOUNTER — HOSPITAL ENCOUNTER (OUTPATIENT)
Dept: RADIOLOGY | Facility: HOSPITAL | Age: 69
Discharge: HOME | End: 2024-04-22
Attending: INTERNAL MEDICINE
Payer: MEDICARE

## 2024-04-22 DIAGNOSIS — N18.32 STAGE 3B CHRONIC KIDNEY DISEASE: ICD-10-CM

## 2024-04-22 LAB
INTERPRETATION UR IFE-IMP: NORMAL
PATHOLOGIST INTERPRETATION IFE: NORMAL

## 2024-04-22 PROCEDURE — 76770 US EXAM ABDO BACK WALL COMP: CPT | Mod: TC

## 2024-04-22 PROCEDURE — 76857 US EXAM PELVIC LIMITED: CPT | Mod: TC

## 2024-04-24 LAB — PATHOLOGIST INTERPRETATION UIFE: NORMAL

## 2024-05-23 ENCOUNTER — HOSPITAL ENCOUNTER (OUTPATIENT)
Dept: RADIOLOGY | Facility: HOSPITAL | Age: 69
Discharge: HOME | End: 2024-05-23
Attending: INTERNAL MEDICINE
Payer: MEDICARE

## 2024-05-23 DIAGNOSIS — N13.30 HYDRONEPHROSIS: ICD-10-CM

## 2024-05-23 DIAGNOSIS — N20.0 KIDNEY STONES: ICD-10-CM

## 2024-05-23 PROCEDURE — 74176 CT ABD & PELVIS W/O CONTRAST: CPT | Mod: TC

## 2024-05-27 ENCOUNTER — LAB VISIT (OUTPATIENT)
Dept: LAB | Facility: HOSPITAL | Age: 69
End: 2024-05-27
Attending: INTERNAL MEDICINE
Payer: MEDICARE

## 2024-05-27 DIAGNOSIS — M21.949: ICD-10-CM

## 2024-05-27 DIAGNOSIS — M17.0 PRIMARY OSTEOARTHRITIS OF BOTH KNEES: ICD-10-CM

## 2024-05-27 DIAGNOSIS — M81.0 SENILE OSTEOPOROSIS: ICD-10-CM

## 2024-05-27 LAB
25(OH)D3+25(OH)D2 SERPL-MCNC: 61 NG/ML (ref 30–96)
BASOPHILS # BLD AUTO: 0.04 K/UL (ref 0–0.2)
BASOPHILS NFR BLD: 0.5 % (ref 0–1.9)
DIFFERENTIAL METHOD BLD: ABNORMAL
EOSINOPHIL # BLD AUTO: 0.3 K/UL (ref 0–0.5)
EOSINOPHIL NFR BLD: 3.9 % (ref 0–8)
ERYTHROCYTE [DISTWIDTH] IN BLOOD BY AUTOMATED COUNT: 12.5 % (ref 11.5–14.5)
HCT VFR BLD AUTO: 37.1 % (ref 37–48.5)
HGB BLD-MCNC: 12.6 G/DL (ref 12–16)
IMM GRANULOCYTES # BLD AUTO: 0.02 K/UL (ref 0–0.04)
IMM GRANULOCYTES NFR BLD AUTO: 0.3 % (ref 0–0.5)
LYMPHOCYTES # BLD AUTO: 3.9 K/UL (ref 1–4.8)
LYMPHOCYTES NFR BLD: 50.3 % (ref 18–48)
MCH RBC QN AUTO: 33.1 PG (ref 27–31)
MCHC RBC AUTO-ENTMCNC: 34 G/DL (ref 32–36)
MCV RBC AUTO: 97 FL (ref 82–98)
MONOCYTES # BLD AUTO: 0.6 K/UL (ref 0.3–1)
MONOCYTES NFR BLD: 8 % (ref 4–15)
NEUTROPHILS # BLD AUTO: 2.8 K/UL (ref 1.8–7.7)
NEUTROPHILS NFR BLD: 37 % (ref 38–73)
NRBC BLD-RTO: 0 /100 WBC
PHOSPHATE SERPL-MCNC: 3.1 MG/DL (ref 2.7–4.5)
PLATELET # BLD AUTO: 235 K/UL (ref 150–450)
PMV BLD AUTO: 9.7 FL (ref 9.2–12.9)
RBC # BLD AUTO: 3.81 M/UL (ref 4–5.4)
WBC # BLD AUTO: 7.66 K/UL (ref 3.9–12.7)

## 2024-05-27 PROCEDURE — 36415 COLL VENOUS BLD VENIPUNCTURE: CPT | Performed by: INTERNAL MEDICINE

## 2024-05-27 PROCEDURE — 84100 ASSAY OF PHOSPHORUS: CPT | Performed by: INTERNAL MEDICINE

## 2024-05-27 PROCEDURE — 82306 VITAMIN D 25 HYDROXY: CPT | Performed by: INTERNAL MEDICINE

## 2024-05-27 PROCEDURE — 85025 COMPLETE CBC W/AUTO DIFF WBC: CPT | Performed by: INTERNAL MEDICINE

## 2024-11-21 ENCOUNTER — HOSPITAL ENCOUNTER (OUTPATIENT)
Dept: RADIOLOGY | Facility: HOSPITAL | Age: 69
Discharge: HOME | End: 2024-11-21
Attending: NURSE PRACTITIONER
Payer: MEDICARE

## 2024-11-21 VITALS — WEIGHT: 102 LBS | HEIGHT: 62 IN | BODY MASS INDEX: 18.77 KG/M2

## 2024-11-21 DIAGNOSIS — Z12.31 ENCOUNTER FOR SCREENING MAMMOGRAM FOR BREAST CANCER: ICD-10-CM

## 2024-11-21 PROCEDURE — 77067 SCR MAMMO BI INCL CAD: CPT | Mod: TC

## 2024-11-26 ENCOUNTER — LAB VISIT (OUTPATIENT)
Dept: LAB | Facility: HOSPITAL | Age: 69
End: 2024-11-26
Attending: INTERNAL MEDICINE
Payer: MEDICARE

## 2024-11-26 DIAGNOSIS — M81.0 SENILE OSTEOPOROSIS: Primary | ICD-10-CM

## 2024-11-26 DIAGNOSIS — N18.30 CKD (CHRONIC KIDNEY DISEASE), STAGE III: ICD-10-CM

## 2024-11-26 LAB
ALBUMIN SERPL BCP-MCNC: 4 G/DL (ref 3.5–5.2)
ALP SERPL-CCNC: 30 U/L (ref 55–135)
ALT SERPL W/O P-5'-P-CCNC: 15 U/L (ref 10–44)
ANION GAP SERPL CALC-SCNC: 8 MMOL/L (ref 3–11)
AST SERPL-CCNC: 13 U/L (ref 10–40)
BILIRUB SERPL-MCNC: 0.2 MG/DL (ref 0.1–1)
BUN SERPL-MCNC: 17 MG/DL (ref 8–23)
CALCIUM SERPL-MCNC: 8.6 MG/DL (ref 8.7–10.5)
CHLORIDE SERPL-SCNC: 105 MMOL/L (ref 95–110)
CO2 SERPL-SCNC: 25 MMOL/L (ref 23–29)
CREAT SERPL-MCNC: 1.2 MG/DL (ref 0.5–1.4)
EST. GFR  (NO RACE VARIABLE): 49 ML/MIN/1.73 M^2
GLUCOSE SERPL-MCNC: 101 MG/DL (ref 70–110)
PHOSPHATE SERPL-MCNC: 3 MG/DL (ref 2.7–4.5)
POTASSIUM SERPL-SCNC: 3.9 MMOL/L (ref 3.5–5.1)
PROT SERPL-MCNC: 7.2 G/DL (ref 6–8.4)
SODIUM SERPL-SCNC: 138 MMOL/L (ref 136–145)

## 2024-11-26 PROCEDURE — 36415 COLL VENOUS BLD VENIPUNCTURE: CPT | Performed by: INTERNAL MEDICINE

## 2024-11-26 PROCEDURE — 80053 COMPREHEN METABOLIC PANEL: CPT | Performed by: INTERNAL MEDICINE

## 2024-11-26 PROCEDURE — 84100 ASSAY OF PHOSPHORUS: CPT | Performed by: INTERNAL MEDICINE

## 2024-12-04 ENCOUNTER — HOSPITAL ENCOUNTER (OUTPATIENT)
Dept: RADIOLOGY | Facility: HOSPITAL | Age: 69
Discharge: HOME | End: 2024-12-04
Attending: INTERNAL MEDICINE
Payer: MEDICARE

## 2024-12-04 DIAGNOSIS — J45.909 UNSPECIFIED ASTHMA, UNCOMPLICATED: ICD-10-CM

## 2024-12-04 PROCEDURE — 71046 X-RAY EXAM CHEST 2 VIEWS: CPT | Mod: TC

## 2024-12-09 ENCOUNTER — LAB VISIT (OUTPATIENT)
Dept: LAB | Facility: HOSPITAL | Age: 69
End: 2024-12-09
Attending: NURSE PRACTITIONER
Payer: MEDICARE

## 2024-12-09 DIAGNOSIS — N18.32 STAGE 3B CHRONIC KIDNEY DISEASE: Primary | ICD-10-CM

## 2024-12-09 LAB
ALBUMIN SERPL BCP-MCNC: 3.7 G/DL (ref 3.5–5.2)
ALBUMIN/CREAT UR: 5.4 UG/MG (ref 0–30)
ALP SERPL-CCNC: 30 U/L (ref 55–135)
ALT SERPL W/O P-5'-P-CCNC: 21 U/L (ref 10–44)
ANION GAP SERPL CALC-SCNC: 11 MMOL/L (ref 3–11)
AST SERPL-CCNC: 12 U/L (ref 10–40)
BACTERIA #/AREA URNS HPF: ABNORMAL /HPF
BASOPHILS # BLD AUTO: 0.03 K/UL (ref 0–0.2)
BASOPHILS NFR BLD: 0.4 % (ref 0–1.9)
BILIRUB SERPL-MCNC: 0.2 MG/DL (ref 0.1–1)
BILIRUB UR QL STRIP: NEGATIVE
BUN SERPL-MCNC: 24 MG/DL (ref 8–23)
CALCIUM SERPL-MCNC: 8.9 MG/DL (ref 8.7–10.5)
CHLORIDE SERPL-SCNC: 106 MMOL/L (ref 95–110)
CLARITY UR: CLEAR
CO2 SERPL-SCNC: 26 MMOL/L (ref 23–29)
COLOR UR: YELLOW
CREAT SERPL-MCNC: 1.3 MG/DL (ref 0.5–1.4)
CREAT UR-MCNC: 194 MG/DL (ref 15–325)
DIFFERENTIAL METHOD BLD: ABNORMAL
EOSINOPHIL # BLD AUTO: 0.1 K/UL (ref 0–0.5)
EOSINOPHIL NFR BLD: 1.7 % (ref 0–8)
ERYTHROCYTE [DISTWIDTH] IN BLOOD BY AUTOMATED COUNT: 12.7 % (ref 11.5–14.5)
EST. GFR  (NO RACE VARIABLE): 44.5 ML/MIN/1.73 M^2
GLUCOSE SERPL-MCNC: 118 MG/DL (ref 70–110)
GLUCOSE UR QL STRIP: NEGATIVE
HCT VFR BLD AUTO: 38.2 % (ref 37–48.5)
HGB BLD-MCNC: 12.8 G/DL (ref 12–16)
HGB UR QL STRIP: NEGATIVE
HYALINE CASTS #/AREA URNS LPF: 2.6 /LPF
IMM GRANULOCYTES # BLD AUTO: 0.02 K/UL (ref 0–0.04)
IMM GRANULOCYTES NFR BLD AUTO: 0.3 % (ref 0–0.5)
KETONES UR QL STRIP: ABNORMAL
LEUKOCYTE ESTERASE UR QL STRIP: ABNORMAL
LYMPHOCYTES # BLD AUTO: 3.1 K/UL (ref 1–4.8)
LYMPHOCYTES NFR BLD: 40.4 % (ref 18–48)
MAGNESIUM SERPL-MCNC: 1.5 MG/DL (ref 1.6–2.6)
MCH RBC QN AUTO: 33.2 PG (ref 27–31)
MCHC RBC AUTO-ENTMCNC: 33.5 G/DL (ref 32–36)
MCV RBC AUTO: 99 FL (ref 82–98)
MICROALBUMIN UR DL<=1MG/L-MCNC: 10.4 MG/L
MICROSCOPIC COMMENT: ABNORMAL
MONOCYTES # BLD AUTO: 0.6 K/UL (ref 0.3–1)
MONOCYTES NFR BLD: 7.7 % (ref 4–15)
NEUTROPHILS # BLD AUTO: 3.8 K/UL (ref 1.8–7.7)
NEUTROPHILS NFR BLD: 49.5 % (ref 38–73)
NITRITE UR QL STRIP: NEGATIVE
NRBC BLD-RTO: 0 /100 WBC
PH UR STRIP: 6 [PH] (ref 5–8)
PHOSPHATE SERPL-MCNC: 3.6 MG/DL (ref 2.7–4.5)
PLATELET # BLD AUTO: 253 K/UL (ref 150–450)
PMV BLD AUTO: 10.4 FL (ref 9.2–12.9)
POTASSIUM SERPL-SCNC: 4.1 MMOL/L (ref 3.5–5.1)
PROT SERPL-MCNC: 6.9 G/DL (ref 6–8.4)
PROT UR QL STRIP: ABNORMAL
RBC # BLD AUTO: 3.85 M/UL (ref 4–5.4)
RBC #/AREA URNS HPF: 3 /HPF (ref 0–4)
SODIUM SERPL-SCNC: 143 MMOL/L (ref 136–145)
SP GR UR STRIP: 1.02 (ref 1–1.03)
SQUAMOUS #/AREA URNS HPF: 11 /HPF
URN SPEC COLLECT METH UR: ABNORMAL
UROBILINOGEN UR STRIP-ACNC: NEGATIVE EU/DL
WBC # BLD AUTO: 7.65 K/UL (ref 3.9–12.7)
WBC #/AREA URNS HPF: 12 /HPF (ref 0–5)

## 2024-12-09 PROCEDURE — 85025 COMPLETE CBC W/AUTO DIFF WBC: CPT | Performed by: NURSE PRACTITIONER

## 2024-12-09 PROCEDURE — 83735 ASSAY OF MAGNESIUM: CPT | Performed by: NURSE PRACTITIONER

## 2024-12-09 PROCEDURE — 81000 URINALYSIS NONAUTO W/SCOPE: CPT | Performed by: NURSE PRACTITIONER

## 2024-12-09 PROCEDURE — 80053 COMPREHEN METABOLIC PANEL: CPT | Performed by: NURSE PRACTITIONER

## 2024-12-09 PROCEDURE — 84100 ASSAY OF PHOSPHORUS: CPT | Performed by: NURSE PRACTITIONER

## 2024-12-09 PROCEDURE — 36415 COLL VENOUS BLD VENIPUNCTURE: CPT | Performed by: NURSE PRACTITIONER

## 2024-12-09 PROCEDURE — 82043 UR ALBUMIN QUANTITATIVE: CPT | Performed by: NURSE PRACTITIONER

## 2024-12-13 ENCOUNTER — HOSPITAL ENCOUNTER (OUTPATIENT)
Dept: RADIOLOGY | Facility: HOSPITAL | Age: 69
Discharge: HOME | End: 2024-12-13
Attending: FAMILY MEDICINE
Payer: MEDICARE

## 2024-12-13 DIAGNOSIS — M43.16 SPONDYLOLISTHESIS, LUMBAR REGION: ICD-10-CM

## 2024-12-13 DIAGNOSIS — M54.59 WEIGHT LIFTER'S BACK: ICD-10-CM

## 2024-12-13 DIAGNOSIS — M51.362 DEGENERATION OF INTERVERTEBRAL DISC OF LUMBAR REGION WITH DISCOGENIC BACK PAIN AND LOWER EXTREMITY PAIN: ICD-10-CM

## 2024-12-13 DIAGNOSIS — M54.16 LUMBAR RADICULOPATHY: ICD-10-CM

## 2024-12-13 PROCEDURE — 72148 MRI LUMBAR SPINE W/O DYE: CPT | Mod: TC

## 2025-01-13 ENCOUNTER — HOSPITAL ENCOUNTER (OUTPATIENT)
Dept: PULMONOLOGY | Facility: HOSPITAL | Age: 70
Discharge: HOME | End: 2025-01-13
Attending: NURSE PRACTITIONER
Payer: MEDICARE

## 2025-01-13 ENCOUNTER — HOSPITAL ENCOUNTER (OUTPATIENT)
Dept: RADIOLOGY | Facility: HOSPITAL | Age: 70
Discharge: HOME | End: 2025-01-13
Attending: NURSE PRACTITIONER
Payer: MEDICARE

## 2025-01-13 DIAGNOSIS — Z01.818 PREOP EXAMINATION: ICD-10-CM

## 2025-01-13 LAB
OHS QRS DURATION: 84 MS
OHS QTC CALCULATION: 402 MS

## 2025-01-13 PROCEDURE — 93010 ELECTROCARDIOGRAM REPORT: CPT | Mod: ,,, | Performed by: INTERNAL MEDICINE

## 2025-01-13 PROCEDURE — 93005 ELECTROCARDIOGRAM TRACING: CPT

## 2025-01-13 PROCEDURE — 71046 X-RAY EXAM CHEST 2 VIEWS: CPT | Mod: TC

## 2025-01-14 ENCOUNTER — LAB VISIT (OUTPATIENT)
Dept: LAB | Facility: HOSPITAL | Age: 70
End: 2025-01-14
Attending: NURSE PRACTITIONER
Payer: MEDICARE

## 2025-01-14 DIAGNOSIS — Z01.818 PREOP EXAMINATION: ICD-10-CM

## 2025-01-14 DIAGNOSIS — N18.9 CHRONIC KIDNEY DISEASE, UNSPECIFIED CKD STAGE: ICD-10-CM

## 2025-01-14 LAB
ALBUMIN/CREAT UR: 58.7 UG/MG (ref 0–30)
BILIRUB UR QL STRIP: NEGATIVE
CLARITY UR: CLEAR
COLOR UR: YELLOW
CREAT UR-MCNC: 39.2 MG/DL (ref 15–325)
GLUCOSE UR QL STRIP: NEGATIVE
HGB UR QL STRIP: NEGATIVE
KETONES UR QL STRIP: NEGATIVE
LEUKOCYTE ESTERASE UR QL STRIP: NEGATIVE
MICROALBUMIN UR DL<=1MG/L-MCNC: 23 UG/ML
NITRITE UR QL STRIP: NEGATIVE
PH UR STRIP: 7 [PH] (ref 5–8)
PROT UR QL STRIP: ABNORMAL
SP GR UR STRIP: 1.01 (ref 1–1.03)
URN SPEC COLLECT METH UR: ABNORMAL
UROBILINOGEN UR STRIP-ACNC: NEGATIVE EU/DL

## 2025-01-14 PROCEDURE — 82570 ASSAY OF URINE CREATININE: CPT | Performed by: NURSE PRACTITIONER

## 2025-01-14 PROCEDURE — 81003 URINALYSIS AUTO W/O SCOPE: CPT | Performed by: NURSE PRACTITIONER

## 2025-01-20 ENCOUNTER — LAB VISIT (OUTPATIENT)
Dept: LAB | Facility: HOSPITAL | Age: 70
End: 2025-01-20
Attending: NURSE PRACTITIONER
Payer: MEDICARE

## 2025-01-20 DIAGNOSIS — N18.9 CHRONIC KIDNEY DISEASE, UNSPECIFIED CKD STAGE: ICD-10-CM

## 2025-01-20 LAB
ALBUMIN SERPL BCP-MCNC: 3.9 G/DL (ref 3.5–5.2)
ALP SERPL-CCNC: 36 U/L (ref 55–135)
ALT SERPL W/O P-5'-P-CCNC: 14 U/L (ref 10–44)
ANION GAP SERPL CALC-SCNC: 7 MMOL/L (ref 8–16)
AST SERPL-CCNC: 16 U/L (ref 10–40)
BASOPHILS # BLD AUTO: 0.05 K/UL (ref 0–0.2)
BASOPHILS NFR BLD: 0.5 % (ref 0–1.9)
BILIRUB SERPL-MCNC: 0.1 MG/DL (ref 0.1–1)
BUN SERPL-MCNC: 21 MG/DL (ref 8–23)
CALCIUM SERPL-MCNC: 8.8 MG/DL (ref 8.7–10.5)
CHLORIDE SERPL-SCNC: 110 MMOL/L (ref 95–110)
CO2 SERPL-SCNC: 22 MMOL/L (ref 23–29)
CREAT SERPL-MCNC: 1 MG/DL (ref 0.5–1.4)
DIFFERENTIAL METHOD BLD: ABNORMAL
EOSINOPHIL # BLD AUTO: 0.2 K/UL (ref 0–0.5)
EOSINOPHIL NFR BLD: 1.4 % (ref 0–8)
ERYTHROCYTE [DISTWIDTH] IN BLOOD BY AUTOMATED COUNT: 12.1 % (ref 11.5–14.5)
EST. GFR  (NO RACE VARIABLE): >60 ML/MIN/1.73 M^2
GLUCOSE SERPL-MCNC: 97 MG/DL (ref 70–110)
HCT VFR BLD AUTO: 36.4 % (ref 37–48.5)
HGB BLD-MCNC: 12.3 G/DL (ref 12–16)
IMM GRANULOCYTES # BLD AUTO: 0.08 K/UL (ref 0–0.04)
IMM GRANULOCYTES NFR BLD AUTO: 0.7 % (ref 0–0.5)
LYMPHOCYTES # BLD AUTO: 4.5 K/UL (ref 1–4.8)
LYMPHOCYTES NFR BLD: 41 % (ref 18–48)
MCH RBC QN AUTO: 33.6 PG (ref 27–31)
MCHC RBC AUTO-ENTMCNC: 33.8 G/DL (ref 32–36)
MCV RBC AUTO: 100 FL (ref 82–98)
MONOCYTES # BLD AUTO: 0.8 K/UL (ref 0.3–1)
MONOCYTES NFR BLD: 7.6 % (ref 4–15)
NEUTROPHILS # BLD AUTO: 5.3 K/UL (ref 1.8–7.7)
NEUTROPHILS NFR BLD: 48.8 % (ref 38–73)
NRBC BLD-RTO: 0 /100 WBC
PLATELET # BLD AUTO: 271 K/UL (ref 150–450)
PMV BLD AUTO: 9.8 FL (ref 9.2–12.9)
POTASSIUM SERPL-SCNC: 4 MMOL/L (ref 3.5–5.1)
PROT SERPL-MCNC: 7.2 G/DL (ref 6–8.4)
RBC # BLD AUTO: 3.66 M/UL (ref 4–5.4)
SODIUM SERPL-SCNC: 139 MMOL/L (ref 136–145)
WBC # BLD AUTO: 10.89 K/UL (ref 3.9–12.7)

## 2025-01-20 PROCEDURE — 36415 COLL VENOUS BLD VENIPUNCTURE: CPT | Performed by: NURSE PRACTITIONER

## 2025-01-20 PROCEDURE — 80053 COMPREHEN METABOLIC PANEL: CPT | Performed by: NURSE PRACTITIONER

## 2025-01-20 PROCEDURE — 85025 COMPLETE CBC W/AUTO DIFF WBC: CPT | Performed by: NURSE PRACTITIONER

## 2025-04-16 ENCOUNTER — HOSPITAL ENCOUNTER (OUTPATIENT)
Dept: RADIOLOGY | Facility: HOSPITAL | Age: 70
Discharge: HOME | End: 2025-04-16
Attending: INTERNAL MEDICINE
Payer: MEDICARE

## 2025-04-16 DIAGNOSIS — M81.0 OSTEOPOROSIS, POST-MENOPAUSAL: ICD-10-CM

## 2025-04-16 PROCEDURE — 77080 DXA BONE DENSITY AXIAL: CPT | Mod: TC

## (undated) DEVICE — GLOVE BIOGEL ECLIPSE SZ 6.5

## (undated) DEVICE — UNDERPAD DISPOSABLE 30X30IN

## (undated) DEVICE — BASIN EMESIS GRAPHITE 500ML

## (undated) DEVICE — GLOVE BIOGEL ECLIPSE SZ 7.5

## (undated) DEVICE — TUBING SUC UNIV W/CONN 12FT

## (undated) DEVICE — CONNECTOR TORRENT SCP OLYMPUS

## (undated) DEVICE — CANNULA ADULT NASAL 7FT

## (undated) DEVICE — WATER IRR STRL USP PLAS 250ML

## (undated) DEVICE — SHEILD EYE PROTCT BLUE FLEX

## (undated) DEVICE — UNDERGLOVES BIOGEL PI SZ 7 LF

## (undated) DEVICE — TUBING OXYGEN CONNECT BUBBLE

## (undated) DEVICE — ELECTRODE FOAM 535 TEARDROP

## (undated) DEVICE — SYR SLIP TIP 60 CC DISP

## (undated) DEVICE — SOL IRRI STRL WATER 1000ML

## (undated) DEVICE — CANNULA SUPERSOFT CO2 M AD 7FT

## (undated) DEVICE — SPONGE DRY VIA GREEN

## (undated) DEVICE — GOWN SURGICAL BRTHBL XL

## (undated) DEVICE — LINER SUCTION CANNISTER REGUGA

## (undated) DEVICE — KIT VIA CUSTOM PROCEDURE

## (undated) DEVICE — KIT BIOGUARD AIR WTR SUC VALVE

## (undated) DEVICE — TUBE SUC UNIVERSAL .25XIN 6FT